# Patient Record
Sex: MALE | Race: WHITE | NOT HISPANIC OR LATINO | URBAN - METROPOLITAN AREA
[De-identification: names, ages, dates, MRNs, and addresses within clinical notes are randomized per-mention and may not be internally consistent; named-entity substitution may affect disease eponyms.]

---

## 2017-01-19 ENCOUNTER — OUTPATIENT (OUTPATIENT)
Dept: OUTPATIENT SERVICES | Facility: HOSPITAL | Age: 67
LOS: 1 days | Discharge: HOME | End: 2017-01-19
Payer: MEDICARE

## 2017-01-19 DIAGNOSIS — Z01.818 ENCOUNTER FOR OTHER PREPROCEDURAL EXAMINATION: ICD-10-CM

## 2017-01-19 DIAGNOSIS — M43.17 SPONDYLOLISTHESIS, LUMBOSACRAL REGION: ICD-10-CM

## 2017-01-19 DIAGNOSIS — M47.26 OTHER SPONDYLOSIS WITH RADICULOPATHY, LUMBAR REGION: ICD-10-CM

## 2017-01-19 DIAGNOSIS — N32.0 BLADDER-NECK OBSTRUCTION: ICD-10-CM

## 2017-01-19 DIAGNOSIS — M96.1 POSTLAMINECTOMY SYNDROME, NOT ELSEWHERE CLASSIFIED: ICD-10-CM

## 2017-01-19 DIAGNOSIS — Z53.09 PROCEDURE AND TREATMENT NOT CARRIED OUT BECAUSE OF OTHER CONTRAINDICATION: ICD-10-CM

## 2017-01-19 DIAGNOSIS — M48.06 SPINAL STENOSIS, LUMBAR REGION: ICD-10-CM

## 2017-01-19 PROCEDURE — 93010 ELECTROCARDIOGRAM REPORT: CPT | Mod: NC

## 2017-01-19 PROCEDURE — 71020: CPT | Mod: 26

## 2017-02-01 ENCOUNTER — OUTPATIENT (OUTPATIENT)
Dept: INPATIENT UNIT | Facility: HOSPITAL | Age: 67
LOS: 1 days | Discharge: HOME | End: 2017-02-01

## 2017-02-01 VITALS
TEMPERATURE: 98 F | OXYGEN SATURATION: 100 % | HEART RATE: 64 BPM | RESPIRATION RATE: 18 BRPM | SYSTOLIC BLOOD PRESSURE: 163 MMHG | WEIGHT: 212.08 LBS | HEIGHT: 71 IN | DIASTOLIC BLOOD PRESSURE: 96 MMHG

## 2017-02-01 VITALS
TEMPERATURE: 98 F | HEART RATE: 65 BPM | OXYGEN SATURATION: 97 % | SYSTOLIC BLOOD PRESSURE: 150 MMHG | DIASTOLIC BLOOD PRESSURE: 89 MMHG | RESPIRATION RATE: 16 BRPM

## 2017-02-01 DIAGNOSIS — M54.9 DORSALGIA, UNSPECIFIED: ICD-10-CM

## 2017-02-01 DIAGNOSIS — M54.16 RADICULOPATHY, LUMBAR REGION: ICD-10-CM

## 2017-02-01 DIAGNOSIS — R09.82 POSTNASAL DRIP: ICD-10-CM

## 2017-02-01 DIAGNOSIS — M43.17 SPONDYLOLISTHESIS, LUMBOSACRAL REGION: ICD-10-CM

## 2017-02-01 DIAGNOSIS — M47.26 OTHER SPONDYLOSIS WITH RADICULOPATHY, LUMBAR REGION: ICD-10-CM

## 2017-02-01 DIAGNOSIS — Z85.46 PERSONAL HISTORY OF MALIGNANT NEOPLASM OF PROSTATE: ICD-10-CM

## 2017-02-01 DIAGNOSIS — M48.06 SPINAL STENOSIS, LUMBAR REGION: ICD-10-CM

## 2017-02-01 DIAGNOSIS — E66.9 OBESITY, UNSPECIFIED: ICD-10-CM

## 2017-02-01 DIAGNOSIS — Z98.890 OTHER SPECIFIED POSTPROCEDURAL STATES: Chronic | ICD-10-CM

## 2017-02-01 DIAGNOSIS — G47.30 SLEEP APNEA, UNSPECIFIED: ICD-10-CM

## 2017-02-01 DIAGNOSIS — N32.0 BLADDER-NECK OBSTRUCTION: ICD-10-CM

## 2017-02-01 DIAGNOSIS — Z90.79 ACQUIRED ABSENCE OF OTHER GENITAL ORGAN(S): Chronic | ICD-10-CM

## 2017-02-01 DIAGNOSIS — Z53.09 PROCEDURE AND TREATMENT NOT CARRIED OUT BECAUSE OF OTHER CONTRAINDICATION: ICD-10-CM

## 2017-02-01 DIAGNOSIS — J30.89 OTHER ALLERGIC RHINITIS: ICD-10-CM

## 2017-02-01 RX ORDER — ONDANSETRON 8 MG/1
4 TABLET, FILM COATED ORAL ONCE
Qty: 0 | Refills: 0 | Status: DISCONTINUED | OUTPATIENT
Start: 2017-02-01 | End: 2017-02-01

## 2017-02-01 RX ORDER — OXYCODONE HYDROCHLORIDE 5 MG/1
5 TABLET ORAL ONCE
Qty: 0 | Refills: 0 | Status: DISCONTINUED | OUTPATIENT
Start: 2017-02-01 | End: 2017-02-01

## 2017-02-01 RX ORDER — SODIUM CHLORIDE 9 MG/ML
1000 INJECTION, SOLUTION INTRAVENOUS
Qty: 0 | Refills: 0 | Status: DISCONTINUED | OUTPATIENT
Start: 2017-02-01 | End: 2017-02-01

## 2017-02-01 RX ADMIN — OXYCODONE HYDROCHLORIDE 5 MILLIGRAM(S): 5 TABLET ORAL at 14:16

## 2017-02-01 NOTE — BRIEF OPERATIVE NOTE - OPERATION/FINDINGS
Surgery was aborted due to inability to pass huang catheter. After consultation (phone) with Urology and given hx of prior prostate surgery, we elected to abort procedure, F/U with urology as outpatient and reschedule this case with URology present at the time of surgery.

## 2017-02-01 NOTE — PATIENT PROFILE ADULT. - VISION (WITH CORRECTIVE LENSES IF THE PATIENT USUALLY WEARS THEM):
Partially impaired: cannot see medication labels or newsprint, but can see obstacles in path, and the surrounding layout; can count fingers at arm's length/glasses for reading

## 2017-02-01 NOTE — PATIENT PROFILE ADULT. - PMH
<<----- Click to add NO pertinent Past Medical History No pertinent past medical history Prostate cancer    Sleep apnea, unspecified type    Spondylosis of lumbosacral region, unspecified spinal osteoarthritis complication status

## 2017-02-02 ENCOUNTER — APPOINTMENT (OUTPATIENT)
Dept: UROLOGY | Facility: CLINIC | Age: 67
End: 2017-02-02

## 2017-02-02 VITALS
WEIGHT: 215 LBS | SYSTOLIC BLOOD PRESSURE: 151 MMHG | DIASTOLIC BLOOD PRESSURE: 73 MMHG | TEMPERATURE: 97.8 F | HEIGHT: 71 IN | HEART RATE: 68 BPM | BODY MASS INDEX: 30.1 KG/M2

## 2017-02-02 DIAGNOSIS — K40.90 UNILATERAL INGUINAL HERNIA, W/OUT OBSTRUCTION OR GANGRENE, NOT SPECIFIED AS RECURRENT: ICD-10-CM

## 2017-02-02 DIAGNOSIS — Z85.46 PERSONAL HISTORY OF MALIGNANT NEOPLASM OF PROSTATE: ICD-10-CM

## 2017-02-02 DIAGNOSIS — Z98.890 OTHER SPECIFIED POSTPROCEDURAL STATES: ICD-10-CM

## 2017-03-15 ENCOUNTER — RECORD ABSTRACTING (OUTPATIENT)
Age: 67
End: 2017-03-15

## 2017-03-15 DIAGNOSIS — N52.9 MALE ERECTILE DYSFUNCTION, UNSPECIFIED: ICD-10-CM

## 2017-03-15 DIAGNOSIS — M54.16 RADICULOPATHY, LUMBAR REGION: ICD-10-CM

## 2017-03-15 DIAGNOSIS — Z82.49 FAMILY HISTORY OF ISCHEMIC HEART DISEASE AND OTHER DISEASES OF THE CIRCULATORY SYSTEM: ICD-10-CM

## 2017-03-15 DIAGNOSIS — K63.5 POLYP OF COLON: ICD-10-CM

## 2017-03-15 DIAGNOSIS — Z80.3 FAMILY HISTORY OF MALIGNANT NEOPLASM OF BREAST: ICD-10-CM

## 2017-03-15 DIAGNOSIS — Z83.3 FAMILY HISTORY OF DIABETES MELLITUS: ICD-10-CM

## 2017-03-15 DIAGNOSIS — Z92.89 PERSONAL HISTORY OF OTHER MEDICAL TREATMENT: ICD-10-CM

## 2017-03-15 DIAGNOSIS — Z78.9 OTHER SPECIFIED HEALTH STATUS: ICD-10-CM

## 2017-03-15 DIAGNOSIS — E55.9 VITAMIN D DEFICIENCY, UNSPECIFIED: ICD-10-CM

## 2017-03-28 ENCOUNTER — OUTPATIENT (OUTPATIENT)
Dept: OUTPATIENT SERVICES | Facility: HOSPITAL | Age: 67
LOS: 1 days | Discharge: HOME | End: 2017-03-28

## 2017-03-28 VITALS
RESPIRATION RATE: 18 BRPM | SYSTOLIC BLOOD PRESSURE: 119 MMHG | DIASTOLIC BLOOD PRESSURE: 81 MMHG | WEIGHT: 223.99 LBS | TEMPERATURE: 98 F | HEART RATE: 57 BPM | OXYGEN SATURATION: 100 % | HEIGHT: 69.5 IN

## 2017-03-28 DIAGNOSIS — Z98.890 OTHER SPECIFIED POSTPROCEDURAL STATES: Chronic | ICD-10-CM

## 2017-03-28 DIAGNOSIS — I87.2 VENOUS INSUFFICIENCY (CHRONIC) (PERIPHERAL): ICD-10-CM

## 2017-03-28 DIAGNOSIS — M96.1 POSTLAMINECTOMY SYNDROME, NOT ELSEWHERE CLASSIFIED: ICD-10-CM

## 2017-03-28 DIAGNOSIS — I10 ESSENTIAL (PRIMARY) HYPERTENSION: ICD-10-CM

## 2017-03-28 DIAGNOSIS — T38.0X5A ADVERSE EFFECT OF GLUCOCORTICOIDS AND SYNTHETIC ANALOGUES, INITIAL ENCOUNTER: ICD-10-CM

## 2017-03-28 DIAGNOSIS — Z90.79 ACQUIRED ABSENCE OF OTHER GENITAL ORGAN(S): Chronic | ICD-10-CM

## 2017-03-28 DIAGNOSIS — M43.17 SPONDYLOLISTHESIS, LUMBOSACRAL REGION: ICD-10-CM

## 2017-03-28 DIAGNOSIS — N32.0 BLADDER-NECK OBSTRUCTION: ICD-10-CM

## 2017-03-28 DIAGNOSIS — M48.07 SPINAL STENOSIS, LUMBOSACRAL REGION: ICD-10-CM

## 2017-03-28 DIAGNOSIS — D72.829 ELEVATED WHITE BLOOD CELL COUNT, UNSPECIFIED: ICD-10-CM

## 2017-03-28 DIAGNOSIS — Z01.818 ENCOUNTER FOR OTHER PREPROCEDURAL EXAMINATION: ICD-10-CM

## 2017-03-28 DIAGNOSIS — M48.06 SPINAL STENOSIS, LUMBAR REGION: ICD-10-CM

## 2017-03-28 LAB
ANION GAP SERPL CALC-SCNC: 4 MMOL/L — LOW (ref 5–17)
APPEARANCE UR: CLEAR — SIGNIFICANT CHANGE UP
APTT BLD: 31.4 SEC — SIGNIFICANT CHANGE UP (ref 27.5–37.4)
BASOPHILS # BLD AUTO: 0.1 K/UL — SIGNIFICANT CHANGE UP (ref 0–0.2)
BASOPHILS NFR BLD AUTO: 1.1 % — SIGNIFICANT CHANGE UP (ref 0–2)
BILIRUB UR-MCNC: NEGATIVE — SIGNIFICANT CHANGE UP
BLD GP AB SCN SERPL QL: SIGNIFICANT CHANGE UP
BUN SERPL-MCNC: 19 MG/DL — SIGNIFICANT CHANGE UP (ref 7–23)
CALCIUM SERPL-MCNC: 9.3 MG/DL — SIGNIFICANT CHANGE UP (ref 8.5–10.1)
CHLORIDE SERPL-SCNC: 107 MMOL/L — SIGNIFICANT CHANGE UP (ref 96–108)
CO2 SERPL-SCNC: 31 MMOL/L — SIGNIFICANT CHANGE UP (ref 22–31)
COLOR SPEC: YELLOW — SIGNIFICANT CHANGE UP
CREAT SERPL-MCNC: 0.98 MG/DL — SIGNIFICANT CHANGE UP (ref 0.5–1.3)
DIFF PNL FLD: NEGATIVE — SIGNIFICANT CHANGE UP
EOSINOPHIL # BLD AUTO: 0.2 K/UL — SIGNIFICANT CHANGE UP (ref 0–0.5)
EOSINOPHIL NFR BLD AUTO: 3.5 % — SIGNIFICANT CHANGE UP (ref 0–6)
GLUCOSE SERPL-MCNC: 107 MG/DL — HIGH (ref 70–99)
GLUCOSE UR QL: NEGATIVE MG/DL — SIGNIFICANT CHANGE UP
HCT VFR BLD CALC: 44.6 % — SIGNIFICANT CHANGE UP (ref 39–50)
HGB BLD-MCNC: 15 G/DL — SIGNIFICANT CHANGE UP (ref 13–17)
INR BLD: 0.95 RATIO — SIGNIFICANT CHANGE UP (ref 0.88–1.16)
KETONES UR-MCNC: NEGATIVE — SIGNIFICANT CHANGE UP
LEUKOCYTE ESTERASE UR-ACNC: NEGATIVE — SIGNIFICANT CHANGE UP
LYMPHOCYTES # BLD AUTO: 2.2 K/UL — SIGNIFICANT CHANGE UP (ref 1–3.3)
LYMPHOCYTES # BLD AUTO: 33.9 % — SIGNIFICANT CHANGE UP (ref 13–44)
MCHC RBC-ENTMCNC: 31 PG — SIGNIFICANT CHANGE UP (ref 27–34)
MCHC RBC-ENTMCNC: 33.6 GM/DL — SIGNIFICANT CHANGE UP (ref 32–36)
MCV RBC AUTO: 92.3 FL — SIGNIFICANT CHANGE UP (ref 80–100)
MONOCYTES # BLD AUTO: 0.5 K/UL — SIGNIFICANT CHANGE UP (ref 0–0.9)
MONOCYTES NFR BLD AUTO: 7.9 % — SIGNIFICANT CHANGE UP (ref 2–14)
MRSA PCR RESULT.: SIGNIFICANT CHANGE UP
NEUTROPHILS # BLD AUTO: 3.6 K/UL — SIGNIFICANT CHANGE UP (ref 1.8–7.4)
NEUTROPHILS NFR BLD AUTO: 53.6 % — SIGNIFICANT CHANGE UP (ref 43–77)
NITRITE UR-MCNC: NEGATIVE — SIGNIFICANT CHANGE UP
PH UR: 5 — SIGNIFICANT CHANGE UP (ref 4.8–8)
PLATELET # BLD AUTO: 256 K/UL — SIGNIFICANT CHANGE UP (ref 150–400)
POTASSIUM SERPL-MCNC: 5 MMOL/L — SIGNIFICANT CHANGE UP (ref 3.5–5.3)
POTASSIUM SERPL-SCNC: 5 MMOL/L — SIGNIFICANT CHANGE UP (ref 3.5–5.3)
PROT UR-MCNC: NEGATIVE MG/DL — SIGNIFICANT CHANGE UP
PROTHROM AB SERPL-ACNC: 10.2 SEC — SIGNIFICANT CHANGE UP (ref 9.8–12.7)
RBC # BLD: 4.83 M/UL — SIGNIFICANT CHANGE UP (ref 4.2–5.8)
RBC # FLD: 11.8 % — SIGNIFICANT CHANGE UP (ref 10.3–14.5)
S AUREUS DNA NOSE QL NAA+PROBE: SIGNIFICANT CHANGE UP
SODIUM SERPL-SCNC: 142 MMOL/L — SIGNIFICANT CHANGE UP (ref 135–145)
SP GR SPEC: 1.01 — SIGNIFICANT CHANGE UP (ref 1.01–1.02)
TYPE + AB SCN PNL BLD: SIGNIFICANT CHANGE UP
UROBILINOGEN FLD QL: NEGATIVE MG/DL — SIGNIFICANT CHANGE UP
WBC # BLD: 6.6 K/UL — SIGNIFICANT CHANGE UP (ref 3.8–10.5)
WBC # FLD AUTO: 6.6 K/UL — SIGNIFICANT CHANGE UP (ref 3.8–10.5)

## 2017-03-28 RX ORDER — MONTELUKAST 4 MG/1
1 TABLET, CHEWABLE ORAL
Qty: 0 | Refills: 0 | COMMUNITY

## 2017-03-28 NOTE — H&P PST ADULT - ASSESSMENT
67 years old male present to PST prior to L2-S1 Laminectomy, L5-S1 Fusion with Instrumentation , possible Posterior Lumbar Interbody Fusion with Dr. Marlee Cortez.   Plan   1. NPO after midnight  2. Take the following medications with sips of water on the day of procedure: may take Vicodin if needed.  3. Use E-Z sponge as directed  4. Use Mupirocin as directed.  5. Drink a quart of extra  fluids the day before your surgery.  6 Medical clearance with Dr. Augie Childers (PCP) and Dr. Chace Otero (Urologist)  7. CBC, BMP,  PT /PTT /INR, Urinalysis, Type and Screen, MRSA sent to lab  8. EKG and CXR on chart from 1/2017  9. Urinary Catheter placement with Dr. Otero prior to coming for surgery

## 2017-03-28 NOTE — H&P PST ADULT - FAMILY HISTORY
Father  Still living? No  Family history of heart failure, Age at diagnosis: Age Unknown     Mother  Still living? No  Family history of cancer, Age at diagnosis: Age Unknown

## 2017-03-28 NOTE — H&P PST ADULT - HISTORY OF PRESENT ILLNESS
67 years old male with pain to lower back. "Across my entire lower back." Admits to progressively increasing pain "for years".  Admits to paresthesia to bilateral lower extremities at times.  Admits to locking of knees. Denies falls. Denies changes in bowel or urinary habits.  Surgery rescheduled from a month ago due to urethral stricture. Patient to have urinary catheter placed by Urologist prior to surgery.  Planned surgery.

## 2017-03-28 NOTE — H&P PST ADULT - PMH
Prostate cancer  Prostatectomy  Sciatica, unspecified laterality    Seasonal allergic rhinitis, unspecified allergic rhinitis trigger    Sleep apnea, unspecified type  Oral device. No CPAP Machine  Spinal stenosis of lumbar region    Spondylosis of lumbosacral region, unspecified spinal osteoarthritis complication status    Urethral stricture, unspecified stricture type    Varicose veins  left lower extremity

## 2017-03-31 ENCOUNTER — NON-APPOINTMENT (OUTPATIENT)
Age: 67
End: 2017-03-31

## 2017-03-31 ENCOUNTER — APPOINTMENT (OUTPATIENT)
Dept: INTERNAL MEDICINE | Facility: CLINIC | Age: 67
End: 2017-03-31

## 2017-03-31 VITALS
RESPIRATION RATE: 20 BRPM | BODY MASS INDEX: 30.8 KG/M2 | TEMPERATURE: 97.8 F | OXYGEN SATURATION: 98 % | HEIGHT: 71 IN | HEART RATE: 60 BPM | SYSTOLIC BLOOD PRESSURE: 110 MMHG | WEIGHT: 220 LBS | DIASTOLIC BLOOD PRESSURE: 68 MMHG

## 2017-03-31 DIAGNOSIS — Z80.3 FAMILY HISTORY OF MALIGNANT NEOPLASM OF BREAST: ICD-10-CM

## 2017-03-31 DIAGNOSIS — R32 INTRINSIC SPHINCTER DEFICIENCY (ISD): ICD-10-CM

## 2017-03-31 DIAGNOSIS — N36.42 INTRINSIC SPHINCTER DEFICIENCY (ISD): ICD-10-CM

## 2017-03-31 DIAGNOSIS — K46.9 UNSPECIFIED ABDOMINAL HERNIA W/OUT OBSTRUCTION OR GANGRENE: ICD-10-CM

## 2017-03-31 DIAGNOSIS — Z87.39 PERSONAL HISTORY OF OTHER DISEASES OF THE MUSCULOSKELETAL SYSTEM AND CONNECTIVE TISSUE: ICD-10-CM

## 2017-03-31 DIAGNOSIS — Z85.46 PERSONAL HISTORY OF MALIGNANT NEOPLASM OF PROSTATE: ICD-10-CM

## 2017-03-31 DIAGNOSIS — I87.2 VENOUS INSUFFICIENCY (CHRONIC) (PERIPHERAL): ICD-10-CM

## 2017-03-31 DIAGNOSIS — Z78.9 OTHER SPECIFIED HEALTH STATUS: ICD-10-CM

## 2017-03-31 RX ORDER — PHENAZOPYRIDINE 200 MG/1
200 TABLET, FILM COATED ORAL 3 TIMES DAILY
Qty: 21 | Refills: 3 | Status: COMPLETED | COMMUNITY
Start: 2017-02-02 | End: 2017-03-31

## 2017-03-31 RX ORDER — MONTELUKAST SODIUM 10 MG/1
10 TABLET, FILM COATED ORAL DAILY
Refills: 0 | Status: COMPLETED | COMMUNITY
End: 2017-03-31

## 2017-03-31 RX ORDER — PSYLLIUM HUSK 0.4 G
CAPSULE ORAL
Refills: 0 | Status: COMPLETED | COMMUNITY
End: 2017-03-31

## 2017-03-31 RX ORDER — HYDROCODONE BITARTRATE AND ACETAMINOPHEN 5; 300 MG/1; MG/1
TABLET ORAL
Refills: 0 | Status: COMPLETED | COMMUNITY

## 2017-03-31 RX ORDER — MULTIVITAMIN
TABLET ORAL
Refills: 0 | Status: COMPLETED | COMMUNITY
End: 2017-03-31

## 2017-03-31 RX ORDER — CIPROFLOXACIN HYDROCHLORIDE 500 MG/1
500 TABLET, FILM COATED ORAL TWICE DAILY
Qty: 10 | Refills: 0 | Status: COMPLETED | COMMUNITY
Start: 2017-02-02 | End: 2017-03-31

## 2017-03-31 RX ORDER — HYDROCODONE BITARTRATE AND ACETAMINOPHEN 10; 300 MG/1; MG/1
10-300 TABLET ORAL
Refills: 0 | Status: COMPLETED | COMMUNITY
End: 2017-03-31

## 2017-04-04 ENCOUNTER — RESULT REVIEW (OUTPATIENT)
Age: 67
End: 2017-04-04

## 2017-04-04 RX ORDER — HYDROMORPHONE HYDROCHLORIDE 2 MG/ML
30 INJECTION INTRAMUSCULAR; INTRAVENOUS; SUBCUTANEOUS
Qty: 0 | Refills: 0 | Status: DISCONTINUED | OUTPATIENT
Start: 2017-04-05 | End: 2017-04-07

## 2017-04-04 RX ORDER — OXYCODONE HYDROCHLORIDE 5 MG/1
10 TABLET ORAL ONCE
Qty: 0 | Refills: 0 | Status: DISCONTINUED | OUTPATIENT
Start: 2017-04-05 | End: 2017-04-05

## 2017-04-04 RX ORDER — NALOXONE HYDROCHLORIDE 4 MG/.1ML
0.1 SPRAY NASAL
Qty: 0 | Refills: 0 | Status: DISCONTINUED | OUTPATIENT
Start: 2017-04-05 | End: 2017-04-09

## 2017-04-04 RX ORDER — FAMOTIDINE 10 MG/ML
20 INJECTION INTRAVENOUS ONCE
Qty: 0 | Refills: 0 | Status: COMPLETED | OUTPATIENT
Start: 2017-04-05 | End: 2017-04-05

## 2017-04-04 RX ORDER — ONDANSETRON 8 MG/1
4 TABLET, FILM COATED ORAL EVERY 6 HOURS
Qty: 0 | Refills: 0 | Status: DISCONTINUED | OUTPATIENT
Start: 2017-04-05 | End: 2017-04-09

## 2017-04-04 RX ORDER — SODIUM CHLORIDE 9 MG/ML
1000 INJECTION INTRAMUSCULAR; INTRAVENOUS; SUBCUTANEOUS
Qty: 0 | Refills: 0 | Status: DISCONTINUED | OUTPATIENT
Start: 2017-04-05 | End: 2017-04-05

## 2017-04-04 RX ORDER — CELECOXIB 200 MG/1
200 CAPSULE ORAL ONCE
Qty: 0 | Refills: 0 | Status: COMPLETED | OUTPATIENT
Start: 2017-04-05 | End: 2017-04-05

## 2017-04-04 RX ORDER — ACETAMINOPHEN 500 MG
975 TABLET ORAL ONCE
Qty: 0 | Refills: 0 | Status: COMPLETED | OUTPATIENT
Start: 2017-04-05 | End: 2017-04-05

## 2017-04-05 ENCOUNTER — INPATIENT (INPATIENT)
Facility: HOSPITAL | Age: 67
LOS: 3 days | Discharge: HOME | End: 2017-04-09
Attending: ORTHOPAEDIC SURGERY | Admitting: ORTHOPAEDIC SURGERY
Payer: MEDICARE

## 2017-04-05 VITALS
DIASTOLIC BLOOD PRESSURE: 77 MMHG | WEIGHT: 212.53 LBS | RESPIRATION RATE: 16 BRPM | HEART RATE: 57 BPM | TEMPERATURE: 98 F | SYSTOLIC BLOOD PRESSURE: 141 MMHG | HEIGHT: 71 IN | OXYGEN SATURATION: 97 %

## 2017-04-05 DIAGNOSIS — Z98.890 OTHER SPECIFIED POSTPROCEDURAL STATES: Chronic | ICD-10-CM

## 2017-04-05 DIAGNOSIS — Z90.79 ACQUIRED ABSENCE OF OTHER GENITAL ORGAN(S): Chronic | ICD-10-CM

## 2017-04-05 LAB
ANION GAP SERPL CALC-SCNC: 8 MMOL/L — SIGNIFICANT CHANGE UP (ref 5–17)
BASOPHILS # BLD AUTO: 0 K/UL — SIGNIFICANT CHANGE UP (ref 0–0.2)
BASOPHILS NFR BLD AUTO: 0.2 % — SIGNIFICANT CHANGE UP (ref 0–2)
BUN SERPL-MCNC: 18 MG/DL — SIGNIFICANT CHANGE UP (ref 7–23)
CALCIUM SERPL-MCNC: 8 MG/DL — LOW (ref 8.5–10.1)
CHLORIDE SERPL-SCNC: 110 MMOL/L — HIGH (ref 96–108)
CO2 SERPL-SCNC: 24 MMOL/L — SIGNIFICANT CHANGE UP (ref 22–31)
CREAT SERPL-MCNC: 1.01 MG/DL — SIGNIFICANT CHANGE UP (ref 0.5–1.3)
EOSINOPHIL # BLD AUTO: 0 K/UL — SIGNIFICANT CHANGE UP (ref 0–0.5)
EOSINOPHIL NFR BLD AUTO: 0.3 % — SIGNIFICANT CHANGE UP (ref 0–6)
GLUCOSE SERPL-MCNC: 140 MG/DL — HIGH (ref 70–99)
HCT VFR BLD CALC: 38.9 % — LOW (ref 39–50)
HGB BLD-MCNC: 13.4 G/DL — SIGNIFICANT CHANGE UP (ref 13–17)
LYMPHOCYTES # BLD AUTO: 0.9 K/UL — LOW (ref 1–3.3)
LYMPHOCYTES # BLD AUTO: 10.7 % — LOW (ref 13–44)
MCHC RBC-ENTMCNC: 31.5 PG — SIGNIFICANT CHANGE UP (ref 27–34)
MCHC RBC-ENTMCNC: 34.3 GM/DL — SIGNIFICANT CHANGE UP (ref 32–36)
MCV RBC AUTO: 91.9 FL — SIGNIFICANT CHANGE UP (ref 80–100)
MONOCYTES # BLD AUTO: 0.1 K/UL — SIGNIFICANT CHANGE UP (ref 0–0.9)
MONOCYTES NFR BLD AUTO: 1.4 % — LOW (ref 2–14)
NEUTROPHILS # BLD AUTO: 7.3 K/UL — SIGNIFICANT CHANGE UP (ref 1.8–7.4)
NEUTROPHILS NFR BLD AUTO: 87.4 % — HIGH (ref 43–77)
PLATELET # BLD AUTO: 194 K/UL — SIGNIFICANT CHANGE UP (ref 150–400)
POTASSIUM SERPL-MCNC: 4.4 MMOL/L — SIGNIFICANT CHANGE UP (ref 3.5–5.3)
POTASSIUM SERPL-SCNC: 4.4 MMOL/L — SIGNIFICANT CHANGE UP (ref 3.5–5.3)
RBC # BLD: 4.24 M/UL — SIGNIFICANT CHANGE UP (ref 4.2–5.8)
RBC # FLD: 11.5 % — SIGNIFICANT CHANGE UP (ref 10.3–14.5)
SODIUM SERPL-SCNC: 142 MMOL/L — SIGNIFICANT CHANGE UP (ref 135–145)
WBC # BLD: 8.4 K/UL — SIGNIFICANT CHANGE UP (ref 3.8–10.5)
WBC # FLD AUTO: 8.4 K/UL — SIGNIFICANT CHANGE UP (ref 3.8–10.5)

## 2017-04-05 PROCEDURE — 72100 X-RAY EXAM L-S SPINE 2/3 VWS: CPT | Mod: 26

## 2017-04-05 PROCEDURE — 88304 TISSUE EXAM BY PATHOLOGIST: CPT | Mod: 26

## 2017-04-05 RX ORDER — ONDANSETRON 8 MG/1
4 TABLET, FILM COATED ORAL EVERY 6 HOURS
Qty: 0 | Refills: 0 | Status: DISCONTINUED | OUTPATIENT
Start: 2017-04-05 | End: 2017-04-09

## 2017-04-05 RX ORDER — MAGNESIUM HYDROXIDE 400 MG/1
30 TABLET, CHEWABLE ORAL EVERY 12 HOURS
Qty: 0 | Refills: 0 | Status: DISCONTINUED | OUTPATIENT
Start: 2017-04-05 | End: 2017-04-09

## 2017-04-05 RX ORDER — SODIUM CHLORIDE 9 MG/ML
3 INJECTION INTRAMUSCULAR; INTRAVENOUS; SUBCUTANEOUS EVERY 8 HOURS
Qty: 0 | Refills: 0 | Status: DISCONTINUED | OUTPATIENT
Start: 2017-04-05 | End: 2017-04-05

## 2017-04-05 RX ORDER — MEPERIDINE HYDROCHLORIDE 50 MG/ML
12.5 INJECTION INTRAMUSCULAR; INTRAVENOUS; SUBCUTANEOUS
Qty: 0 | Refills: 0 | Status: DISCONTINUED | OUTPATIENT
Start: 2017-04-05 | End: 2017-04-05

## 2017-04-05 RX ORDER — CEFAZOLIN SODIUM 1 G
2000 VIAL (EA) INJECTION EVERY 8 HOURS
Qty: 0 | Refills: 0 | Status: COMPLETED | OUTPATIENT
Start: 2017-04-05 | End: 2017-04-06

## 2017-04-05 RX ORDER — CYCLOBENZAPRINE HYDROCHLORIDE 10 MG/1
10 TABLET, FILM COATED ORAL EVERY 8 HOURS
Qty: 0 | Refills: 0 | Status: DISCONTINUED | OUTPATIENT
Start: 2017-04-05 | End: 2017-04-09

## 2017-04-05 RX ORDER — ACETAMINOPHEN 500 MG
650 TABLET ORAL EVERY 6 HOURS
Qty: 0 | Refills: 0 | Status: DISCONTINUED | OUTPATIENT
Start: 2017-04-05 | End: 2017-04-09

## 2017-04-05 RX ORDER — ONDANSETRON 8 MG/1
4 TABLET, FILM COATED ORAL ONCE
Qty: 0 | Refills: 0 | Status: DISCONTINUED | OUTPATIENT
Start: 2017-04-05 | End: 2017-04-05

## 2017-04-05 RX ORDER — DOCUSATE SODIUM 100 MG
100 CAPSULE ORAL THREE TIMES A DAY
Qty: 0 | Refills: 0 | Status: DISCONTINUED | OUTPATIENT
Start: 2017-04-05 | End: 2017-04-09

## 2017-04-05 RX ORDER — SENNA PLUS 8.6 MG/1
2 TABLET ORAL AT BEDTIME
Qty: 0 | Refills: 0 | Status: DISCONTINUED | OUTPATIENT
Start: 2017-04-05 | End: 2017-04-09

## 2017-04-05 RX ORDER — ACETAMINOPHEN 500 MG
1000 TABLET ORAL ONCE
Qty: 0 | Refills: 0 | Status: COMPLETED | OUTPATIENT
Start: 2017-04-05 | End: 2017-04-05

## 2017-04-05 RX ORDER — ASCORBIC ACID 60 MG
0 TABLET,CHEWABLE ORAL
Qty: 0 | Refills: 0 | COMMUNITY

## 2017-04-05 RX ORDER — OXYCODONE HYDROCHLORIDE 5 MG/1
5 TABLET ORAL EVERY 4 HOURS
Qty: 0 | Refills: 0 | Status: DISCONTINUED | OUTPATIENT
Start: 2017-04-05 | End: 2017-04-05

## 2017-04-05 RX ORDER — SODIUM CHLORIDE 9 MG/ML
1000 INJECTION, SOLUTION INTRAVENOUS
Qty: 0 | Refills: 0 | Status: DISCONTINUED | OUTPATIENT
Start: 2017-04-05 | End: 2017-04-09

## 2017-04-05 RX ORDER — FAMOTIDINE 10 MG/ML
20 INJECTION INTRAVENOUS EVERY 12 HOURS
Qty: 0 | Refills: 0 | Status: DISCONTINUED | OUTPATIENT
Start: 2017-04-05 | End: 2017-04-09

## 2017-04-05 RX ORDER — HYDROMORPHONE HYDROCHLORIDE 2 MG/ML
0.5 INJECTION INTRAMUSCULAR; INTRAVENOUS; SUBCUTANEOUS
Qty: 0 | Refills: 0 | Status: DISCONTINUED | OUTPATIENT
Start: 2017-04-05 | End: 2017-04-05

## 2017-04-05 RX ADMIN — CYCLOBENZAPRINE HYDROCHLORIDE 10 MILLIGRAM(S): 10 TABLET, FILM COATED ORAL at 22:06

## 2017-04-05 RX ADMIN — Medication 100 MILLIGRAM(S): at 21:52

## 2017-04-05 RX ADMIN — CELECOXIB 200 MILLIGRAM(S): 200 CAPSULE ORAL at 10:03

## 2017-04-05 RX ADMIN — OXYCODONE HYDROCHLORIDE 10 MILLIGRAM(S): 5 TABLET ORAL at 10:04

## 2017-04-05 RX ADMIN — Medication 400 MILLIGRAM(S): at 17:49

## 2017-04-05 RX ADMIN — SODIUM CHLORIDE 100 MILLILITER(S): 9 INJECTION INTRAMUSCULAR; INTRAVENOUS; SUBCUTANEOUS at 16:39

## 2017-04-05 RX ADMIN — HYDROMORPHONE HYDROCHLORIDE 30 MILLILITER(S): 2 INJECTION INTRAMUSCULAR; INTRAVENOUS; SUBCUTANEOUS at 16:37

## 2017-04-05 RX ADMIN — FAMOTIDINE 20 MILLIGRAM(S): 10 INJECTION INTRAVENOUS at 10:04

## 2017-04-05 RX ADMIN — Medication 650 MILLIGRAM(S): at 22:06

## 2017-04-05 RX ADMIN — Medication 975 MILLIGRAM(S): at 10:03

## 2017-04-05 RX ADMIN — SENNA PLUS 2 TABLET(S): 8.6 TABLET ORAL at 21:52

## 2017-04-05 RX ADMIN — Medication 100 MILLIGRAM(S): at 21:51

## 2017-04-05 RX ADMIN — Medication 1000 MILLIGRAM(S): at 19:35

## 2017-04-05 RX ADMIN — SODIUM CHLORIDE 100 MILLILITER(S): 9 INJECTION, SOLUTION INTRAVENOUS at 19:25

## 2017-04-06 LAB
ANION GAP SERPL CALC-SCNC: 8 MMOL/L — SIGNIFICANT CHANGE UP (ref 5–17)
BASOPHILS # BLD AUTO: 0.1 K/UL — SIGNIFICANT CHANGE UP (ref 0–0.2)
BASOPHILS NFR BLD AUTO: 0.5 % — SIGNIFICANT CHANGE UP (ref 0–2)
BUN SERPL-MCNC: 13 MG/DL — SIGNIFICANT CHANGE UP (ref 7–23)
CALCIUM SERPL-MCNC: 8.2 MG/DL — LOW (ref 8.5–10.1)
CHLORIDE SERPL-SCNC: 105 MMOL/L — SIGNIFICANT CHANGE UP (ref 96–108)
CO2 SERPL-SCNC: 27 MMOL/L — SIGNIFICANT CHANGE UP (ref 22–31)
CREAT SERPL-MCNC: 0.83 MG/DL — SIGNIFICANT CHANGE UP (ref 0.5–1.3)
EOSINOPHIL # BLD AUTO: 0 K/UL — SIGNIFICANT CHANGE UP (ref 0–0.5)
EOSINOPHIL NFR BLD AUTO: 0.1 % — SIGNIFICANT CHANGE UP (ref 0–6)
GLUCOSE SERPL-MCNC: 108 MG/DL — HIGH (ref 70–99)
HCT VFR BLD CALC: 37.6 % — LOW (ref 39–50)
HGB BLD-MCNC: 12.7 G/DL — LOW (ref 13–17)
LYMPHOCYTES # BLD AUTO: 1.9 K/UL — SIGNIFICANT CHANGE UP (ref 1–3.3)
LYMPHOCYTES # BLD AUTO: 14.6 % — SIGNIFICANT CHANGE UP (ref 13–44)
MCHC RBC-ENTMCNC: 31.4 PG — SIGNIFICANT CHANGE UP (ref 27–34)
MCHC RBC-ENTMCNC: 33.8 GM/DL — SIGNIFICANT CHANGE UP (ref 32–36)
MCV RBC AUTO: 92.7 FL — SIGNIFICANT CHANGE UP (ref 80–100)
MONOCYTES # BLD AUTO: 0.8 K/UL — SIGNIFICANT CHANGE UP (ref 0–0.9)
MONOCYTES NFR BLD AUTO: 5.7 % — SIGNIFICANT CHANGE UP (ref 2–14)
NEUTROPHILS # BLD AUTO: 10.4 K/UL — HIGH (ref 1.8–7.4)
NEUTROPHILS NFR BLD AUTO: 79.1 % — HIGH (ref 43–77)
PLATELET # BLD AUTO: 221 K/UL — SIGNIFICANT CHANGE UP (ref 150–400)
POTASSIUM SERPL-MCNC: 5 MMOL/L — SIGNIFICANT CHANGE UP (ref 3.5–5.3)
POTASSIUM SERPL-SCNC: 5 MMOL/L — SIGNIFICANT CHANGE UP (ref 3.5–5.3)
RBC # BLD: 4.05 M/UL — LOW (ref 4.2–5.8)
RBC # FLD: 11.7 % — SIGNIFICANT CHANGE UP (ref 10.3–14.5)
SODIUM SERPL-SCNC: 140 MMOL/L — SIGNIFICANT CHANGE UP (ref 135–145)
WBC # BLD: 13.2 K/UL — HIGH (ref 3.8–10.5)
WBC # FLD AUTO: 13.2 K/UL — HIGH (ref 3.8–10.5)

## 2017-04-06 PROCEDURE — 93970 EXTREMITY STUDY: CPT | Mod: 26

## 2017-04-06 RX ORDER — DEXAMETHASONE 0.5 MG/5ML
8 ELIXIR ORAL EVERY 12 HOURS
Qty: 0 | Refills: 0 | Status: DISCONTINUED | OUTPATIENT
Start: 2017-04-06 | End: 2017-04-06

## 2017-04-06 RX ORDER — DEXAMETHASONE 0.5 MG/5ML
4 ELIXIR ORAL EVERY 12 HOURS
Qty: 0 | Refills: 0 | Status: COMPLETED | OUTPATIENT
Start: 2017-04-08 | End: 2017-04-09

## 2017-04-06 RX ORDER — DEXAMETHASONE 0.5 MG/5ML
2 ELIXIR ORAL EVERY 12 HOURS
Qty: 0 | Refills: 0 | Status: DISCONTINUED | OUTPATIENT
Start: 2017-04-09 | End: 2017-04-09

## 2017-04-06 RX ORDER — DEXAMETHASONE 0.5 MG/5ML
6 ELIXIR ORAL EVERY 12 HOURS
Qty: 0 | Refills: 0 | Status: COMPLETED | OUTPATIENT
Start: 2017-04-07 | End: 2017-04-08

## 2017-04-06 RX ORDER — DEXAMETHASONE 0.5 MG/5ML
6 ELIXIR ORAL EVERY 6 HOURS
Qty: 0 | Refills: 0 | Status: DISCONTINUED | OUTPATIENT
Start: 2017-04-06 | End: 2017-04-06

## 2017-04-06 RX ORDER — DEXAMETHASONE 0.5 MG/5ML
8 ELIXIR ORAL EVERY 12 HOURS
Qty: 0 | Refills: 0 | Status: COMPLETED | OUTPATIENT
Start: 2017-04-06 | End: 2017-04-07

## 2017-04-06 RX ADMIN — SODIUM CHLORIDE 100 MILLILITER(S): 9 INJECTION, SOLUTION INTRAVENOUS at 17:18

## 2017-04-06 RX ADMIN — SODIUM CHLORIDE 100 MILLILITER(S): 9 INJECTION, SOLUTION INTRAVENOUS at 06:37

## 2017-04-06 RX ADMIN — CYCLOBENZAPRINE HYDROCHLORIDE 10 MILLIGRAM(S): 10 TABLET, FILM COATED ORAL at 14:09

## 2017-04-06 RX ADMIN — SENNA PLUS 2 TABLET(S): 8.6 TABLET ORAL at 21:59

## 2017-04-06 RX ADMIN — Medication 100 MILLIGRAM(S): at 13:08

## 2017-04-06 RX ADMIN — Medication 100 MILLIGRAM(S): at 21:59

## 2017-04-06 RX ADMIN — CYCLOBENZAPRINE HYDROCHLORIDE 10 MILLIGRAM(S): 10 TABLET, FILM COATED ORAL at 21:59

## 2017-04-06 RX ADMIN — HYDROMORPHONE HYDROCHLORIDE 30 MILLILITER(S): 2 INJECTION INTRAMUSCULAR; INTRAVENOUS; SUBCUTANEOUS at 23:08

## 2017-04-06 RX ADMIN — CYCLOBENZAPRINE HYDROCHLORIDE 10 MILLIGRAM(S): 10 TABLET, FILM COATED ORAL at 06:36

## 2017-04-06 RX ADMIN — Medication 100 MILLIGRAM(S): at 06:37

## 2017-04-06 RX ADMIN — Medication 100 MILLIGRAM(S): at 06:35

## 2017-04-06 RX ADMIN — Medication 101.6 MILLIGRAM(S): at 19:10

## 2017-04-06 NOTE — PHYSICAL THERAPY INITIAL EVALUATION ADULT - GENERAL OBSERVATIONS, REHAB EVAL
pt recd supine in bed in No apparent distress, pleasant ans agreeable to PT.  huang catheter, c-vac C/D/I, PCA intact.

## 2017-04-06 NOTE — PHYSICAL THERAPY INITIAL EVALUATION ADULT - PLANNED THERAPY INTERVENTIONS, PT EVAL
strengthening/balance training/bed mobility training/transfer training/postural re-education/gait training

## 2017-04-06 NOTE — PROGRESS NOTE ADULT - SUBJECTIVE AND OBJECTIVE BOX
POD#1. Pt seen in bed c/o right hip pain radiating to posterior aspect of right thigh. Pt states he noticed symptoms at 2am last night. He had similar symptoms in the past with LLE pre-op. Heating compress alleviates the pain, whereas he noticed pain aggravated after sessions with physical therapy. Huang in place. Tolerating current diet.     PE  Gen appearance: NAD  Motor strength:5/5 of b/l lower extremities   Sensation intact  Mild calf tenderness of the RLE. No calf tenderness of the LLE  Incisional site clean and dry  Drain 255CC.    Plan  Afebrile, VSS,  WBC (13.2H) 2/2 surgery continue to monitor  H/H:12.7L/37.6L  PCA and huang kept  Encouraged incentive spirometer and mobilize as tolerated with physical therapy  Start Decadron taper. Decadron 8mg BID followed by Decadon 6mg BID followed by Decadron 4mg BID, followed by Decadron 2mg BID  Discussed with Dr. Cortez. POD#1. Pt seen in bed c/o right hip pain radiating to posterior aspect of right thigh. Pt states he noticed symptoms at 2am last night. He had similar symptoms in the past with LLE pre-op. Heating compress alleviates the pain, whereas he noticed pain aggravated after sessions with physical therapy. Huang in place. Tolerating current diet.     PE  Gen appearance: NAD  Motor strength:5/5 of b/l lower extremities   Sensation intact  Mild calf tenderness of the RLE. No calf tenderness of the LLE  Incisional site clean and dry  Drain 255CC.    Plan  Afebrile, VSS,  WBC (13.2H) 2/2 surgery continue to monitor  H/H:12.7L/37.6L  PCA and huang kept  Encouraged incentive spirometer and mobilize as tolerated with physical therapy  Start Decadron taper. Decadron 8mg BID followed by Decadon 6mg BID followed by Decadron 4mg BID, followed by Decadron 2mg BID  US of b/l lower extremities  Discussed with Dr. Cortez.

## 2017-04-07 LAB
ANION GAP SERPL CALC-SCNC: 8 MMOL/L — SIGNIFICANT CHANGE UP (ref 5–17)
BASOPHILS # BLD AUTO: 0 K/UL — SIGNIFICANT CHANGE UP (ref 0–0.2)
BASOPHILS NFR BLD AUTO: 0.2 % — SIGNIFICANT CHANGE UP (ref 0–2)
BUN SERPL-MCNC: 10 MG/DL — SIGNIFICANT CHANGE UP (ref 7–23)
CALCIUM SERPL-MCNC: 9.2 MG/DL — SIGNIFICANT CHANGE UP (ref 8.5–10.1)
CHLORIDE SERPL-SCNC: 101 MMOL/L — SIGNIFICANT CHANGE UP (ref 96–108)
CO2 SERPL-SCNC: 27 MMOL/L — SIGNIFICANT CHANGE UP (ref 22–31)
CREAT SERPL-MCNC: 0.75 MG/DL — SIGNIFICANT CHANGE UP (ref 0.5–1.3)
EOSINOPHIL # BLD AUTO: 0 K/UL — SIGNIFICANT CHANGE UP (ref 0–0.5)
EOSINOPHIL NFR BLD AUTO: 0 % — SIGNIFICANT CHANGE UP (ref 0–6)
GLUCOSE SERPL-MCNC: 132 MG/DL — HIGH (ref 70–99)
HCT VFR BLD CALC: 40.5 % — SIGNIFICANT CHANGE UP (ref 39–50)
HGB BLD-MCNC: 14.5 G/DL — SIGNIFICANT CHANGE UP (ref 13–17)
MCHC RBC-ENTMCNC: 32.1 PG — SIGNIFICANT CHANGE UP (ref 27–34)
MCHC RBC-ENTMCNC: 35.8 GM/DL — SIGNIFICANT CHANGE UP (ref 32–36)
MCV RBC AUTO: 89.5 FL — SIGNIFICANT CHANGE UP (ref 80–100)
NEUTROPHILS # BLD AUTO: 10.4 K/UL — HIGH (ref 1.8–7.4)
NEUTROPHILS NFR BLD AUTO: 75.7 % — SIGNIFICANT CHANGE UP (ref 43–77)
PLATELET # BLD AUTO: 205 K/UL — SIGNIFICANT CHANGE UP (ref 150–400)
POTASSIUM SERPL-MCNC: 4.2 MMOL/L — SIGNIFICANT CHANGE UP (ref 3.5–5.3)
POTASSIUM SERPL-SCNC: 4.2 MMOL/L — SIGNIFICANT CHANGE UP (ref 3.5–5.3)
RBC # BLD: 4.53 M/UL — SIGNIFICANT CHANGE UP (ref 4.2–5.8)
RBC # FLD: 11.1 % — SIGNIFICANT CHANGE UP (ref 10.3–14.5)
SODIUM SERPL-SCNC: 136 MMOL/L — SIGNIFICANT CHANGE UP (ref 135–145)
SURGICAL PATHOLOGY FINAL REPORT - CH: SIGNIFICANT CHANGE UP
WBC # BLD: 13.7 K/UL — HIGH (ref 3.8–10.5)
WBC # FLD AUTO: 13.7 K/UL — HIGH (ref 3.8–10.5)

## 2017-04-07 RX ORDER — HYDROMORPHONE HYDROCHLORIDE 2 MG/ML
2 INJECTION INTRAMUSCULAR; INTRAVENOUS; SUBCUTANEOUS EVERY 4 HOURS
Qty: 0 | Refills: 0 | Status: DISCONTINUED | OUTPATIENT
Start: 2017-04-07 | End: 2017-04-09

## 2017-04-07 RX ORDER — OXYCODONE HYDROCHLORIDE 5 MG/1
10 TABLET ORAL EVERY 4 HOURS
Qty: 0 | Refills: 0 | Status: DISCONTINUED | OUTPATIENT
Start: 2017-04-07 | End: 2017-04-09

## 2017-04-07 RX ORDER — DIPHENHYDRAMINE HCL 50 MG
50 CAPSULE ORAL EVERY 6 HOURS
Qty: 0 | Refills: 0 | Status: DISCONTINUED | OUTPATIENT
Start: 2017-04-07 | End: 2017-04-09

## 2017-04-07 RX ORDER — OXYCODONE HYDROCHLORIDE 5 MG/1
5 TABLET ORAL EVERY 4 HOURS
Qty: 0 | Refills: 0 | Status: DISCONTINUED | OUTPATIENT
Start: 2017-04-07 | End: 2017-04-09

## 2017-04-07 RX ADMIN — HYDROMORPHONE HYDROCHLORIDE 2 MILLIGRAM(S): 2 INJECTION INTRAMUSCULAR; INTRAVENOUS; SUBCUTANEOUS at 21:10

## 2017-04-07 RX ADMIN — MAGNESIUM HYDROXIDE 30 MILLILITER(S): 400 TABLET, CHEWABLE ORAL at 20:46

## 2017-04-07 RX ADMIN — Medication 100 MILLIGRAM(S): at 05:45

## 2017-04-07 RX ADMIN — Medication 6 MILLIGRAM(S): at 17:22

## 2017-04-07 RX ADMIN — SODIUM CHLORIDE 100 MILLILITER(S): 9 INJECTION, SOLUTION INTRAVENOUS at 05:50

## 2017-04-07 RX ADMIN — OXYCODONE HYDROCHLORIDE 10 MILLIGRAM(S): 5 TABLET ORAL at 18:48

## 2017-04-07 RX ADMIN — Medication 101.6 MILLIGRAM(S): at 05:46

## 2017-04-07 RX ADMIN — CYCLOBENZAPRINE HYDROCHLORIDE 10 MILLIGRAM(S): 10 TABLET, FILM COATED ORAL at 05:45

## 2017-04-07 RX ADMIN — Medication 50 MILLIGRAM(S): at 15:15

## 2017-04-07 RX ADMIN — Medication 50 MILLIGRAM(S): at 02:02

## 2017-04-07 RX ADMIN — Medication 100 MILLIGRAM(S): at 15:17

## 2017-04-07 RX ADMIN — FAMOTIDINE 20 MILLIGRAM(S): 10 INJECTION INTRAVENOUS at 03:03

## 2017-04-07 RX ADMIN — HYDROMORPHONE HYDROCHLORIDE 2 MILLIGRAM(S): 2 INJECTION INTRAMUSCULAR; INTRAVENOUS; SUBCUTANEOUS at 20:46

## 2017-04-07 RX ADMIN — Medication 50 MILLIGRAM(S): at 23:42

## 2017-04-07 NOTE — PROGRESS NOTE ADULT - SUBJECTIVE AND OBJECTIVE BOX
POD#2. Pt seen resting in chair was seen ambulating in hallway. Pt c/o sharp pain of the incisional site when walking. Pt has pain radiating to right hip to the posterior aspect of right thigh minimally improved in comparison to yesterday. Pt states he noticed leakage of urine from the huang today.     PE  Gen appearance: NAD  Motor strength: 5/5 of b/l lower ext  Sensation: intact  No calf tenderness  Incisional site with clean and dry dressing  Drain:100cc.    Plan  Afebrile, VSS  WBC(13.7H) pt is also taking Decadron taper POD#2. Pt seen resting in chair was seen ambulating in hallway. Pt c/o sharp pain of the incisional site when walking. Pt has pain radiating to right hip to the posterior aspect of right thigh minimally improved in comparison to yesterday. Pt states he noticed leakage of urine from the huang today.    PE  Gen appearance: NAD  Motor strength: 5/5 of b/l lower ext  Sensation: intact  No calf tenderness  Incisional site with clean and dry dressing  Drain:100cc.    Plan  Afebrile, VSS  WBC(13.7H) pt is also on Decadron taper--continue to monitor leukocytosis  Mobilize with physical therapy and encouraged incentive spirometer.  Inflate balloon of huang catheter as discussed with Nurse. Urine leakage may be related to deflated huang catheter. Pt reassured. Contact if leakage continues.   Keep Huang for another day. DC PCA and transition to oral and SubQ meds.  Advance diet as tolerated.   Discussed with Dr. Cortez POD#2. Pt seen resting in chair was seen ambulating in hallway. Pt c/o sharp pain of the incisional site when walking. Pt has pain radiating to right hip to the posterior aspect of right thigh minimally improved in comparison to yesterday. Pt states he noticed leakage of urine from the huang today.    PE  Gen appearance: NAD  Motor strength: 5/5 of b/l lower ext  Sensation: intact  No calf tenderness  Incisional site with clean and dry dressing  Drain:100cc.    Plan  Afebrile, VSS  WBC(13.7H) pt is also on Decadron taper--continue to monitor leukocytosis  Mobilize with physical therapy and encouraged incentive spirometer.  Inflate balloon of huang catheter as discussed with Nurse. Urine leakage may be related to deflated huang catheter. Pt reassured. Contact if leakage continues.   Keep Huang for another day. DC PCA and transition to oral and SubQ meds.  Advance diet as tolerated.   US of b/l lower extremities negative  Discussed with Dr. Cortez

## 2017-04-08 LAB
ANION GAP SERPL CALC-SCNC: 7 MMOL/L — SIGNIFICANT CHANGE UP (ref 5–17)
BASOPHILS # BLD AUTO: 0 K/UL — SIGNIFICANT CHANGE UP (ref 0–0.2)
BASOPHILS NFR BLD AUTO: 0.3 % — SIGNIFICANT CHANGE UP (ref 0–2)
BUN SERPL-MCNC: 16 MG/DL — SIGNIFICANT CHANGE UP (ref 7–23)
CALCIUM SERPL-MCNC: 9.1 MG/DL — SIGNIFICANT CHANGE UP (ref 8.5–10.1)
CHLORIDE SERPL-SCNC: 103 MMOL/L — SIGNIFICANT CHANGE UP (ref 96–108)
CO2 SERPL-SCNC: 29 MMOL/L — SIGNIFICANT CHANGE UP (ref 22–31)
CREAT SERPL-MCNC: 0.85 MG/DL — SIGNIFICANT CHANGE UP (ref 0.5–1.3)
EOSINOPHIL # BLD AUTO: 0 K/UL — SIGNIFICANT CHANGE UP (ref 0–0.5)
EOSINOPHIL NFR BLD AUTO: 0 % — SIGNIFICANT CHANGE UP (ref 0–6)
GLUCOSE SERPL-MCNC: 134 MG/DL — HIGH (ref 70–99)
HCT VFR BLD CALC: 41.8 % — SIGNIFICANT CHANGE UP (ref 39–50)
HGB BLD-MCNC: 14.2 G/DL — SIGNIFICANT CHANGE UP (ref 13–17)
LYMPHOCYTES # BLD AUTO: 1.9 K/UL — SIGNIFICANT CHANGE UP (ref 1–3.3)
LYMPHOCYTES # BLD AUTO: 12.7 % — LOW (ref 13–44)
MCHC RBC-ENTMCNC: 31.2 PG — SIGNIFICANT CHANGE UP (ref 27–34)
MCHC RBC-ENTMCNC: 34 GM/DL — SIGNIFICANT CHANGE UP (ref 32–36)
MCV RBC AUTO: 91.9 FL — SIGNIFICANT CHANGE UP (ref 80–100)
MONOCYTES # BLD AUTO: 1.2 K/UL — HIGH (ref 0–0.9)
MONOCYTES NFR BLD AUTO: 8 % — SIGNIFICANT CHANGE UP (ref 2–14)
NEUTROPHILS # BLD AUTO: 12 K/UL — HIGH (ref 1.8–7.4)
NEUTROPHILS NFR BLD AUTO: 79.1 % — HIGH (ref 43–77)
PLATELET # BLD AUTO: 254 K/UL — SIGNIFICANT CHANGE UP (ref 150–400)
POTASSIUM SERPL-MCNC: 4.9 MMOL/L — SIGNIFICANT CHANGE UP (ref 3.5–5.3)
POTASSIUM SERPL-SCNC: 4.9 MMOL/L — SIGNIFICANT CHANGE UP (ref 3.5–5.3)
RBC # BLD: 4.55 M/UL — SIGNIFICANT CHANGE UP (ref 4.2–5.8)
RBC # FLD: 11.5 % — SIGNIFICANT CHANGE UP (ref 10.3–14.5)
SODIUM SERPL-SCNC: 139 MMOL/L — SIGNIFICANT CHANGE UP (ref 135–145)
WBC # BLD: 15.2 K/UL — HIGH (ref 3.8–10.5)
WBC # FLD AUTO: 15.2 K/UL — HIGH (ref 3.8–10.5)

## 2017-04-08 RX ADMIN — HYDROMORPHONE HYDROCHLORIDE 2 MILLIGRAM(S): 2 INJECTION INTRAMUSCULAR; INTRAVENOUS; SUBCUTANEOUS at 20:09

## 2017-04-08 RX ADMIN — SENNA PLUS 2 TABLET(S): 8.6 TABLET ORAL at 22:04

## 2017-04-08 RX ADMIN — HYDROMORPHONE HYDROCHLORIDE 2 MILLIGRAM(S): 2 INJECTION INTRAMUSCULAR; INTRAVENOUS; SUBCUTANEOUS at 05:38

## 2017-04-08 RX ADMIN — Medication 50 MILLIGRAM(S): at 16:19

## 2017-04-08 RX ADMIN — HYDROMORPHONE HYDROCHLORIDE 2 MILLIGRAM(S): 2 INJECTION INTRAMUSCULAR; INTRAVENOUS; SUBCUTANEOUS at 20:39

## 2017-04-08 RX ADMIN — Medication 100 MILLIGRAM(S): at 05:39

## 2017-04-08 RX ADMIN — Medication 100 MILLIGRAM(S): at 14:35

## 2017-04-08 RX ADMIN — HYDROMORPHONE HYDROCHLORIDE 2 MILLIGRAM(S): 2 INJECTION INTRAMUSCULAR; INTRAVENOUS; SUBCUTANEOUS at 17:11

## 2017-04-08 RX ADMIN — Medication 50 MILLIGRAM(S): at 22:04

## 2017-04-08 RX ADMIN — Medication 6 MILLIGRAM(S): at 05:39

## 2017-04-08 RX ADMIN — CYCLOBENZAPRINE HYDROCHLORIDE 10 MILLIGRAM(S): 10 TABLET, FILM COATED ORAL at 09:02

## 2017-04-08 RX ADMIN — HYDROMORPHONE HYDROCHLORIDE 2 MILLIGRAM(S): 2 INJECTION INTRAMUSCULAR; INTRAVENOUS; SUBCUTANEOUS at 14:32

## 2017-04-08 RX ADMIN — Medication 100 MILLIGRAM(S): at 22:04

## 2017-04-08 RX ADMIN — CYCLOBENZAPRINE HYDROCHLORIDE 10 MILLIGRAM(S): 10 TABLET, FILM COATED ORAL at 22:04

## 2017-04-08 RX ADMIN — HYDROMORPHONE HYDROCHLORIDE 2 MILLIGRAM(S): 2 INJECTION INTRAMUSCULAR; INTRAVENOUS; SUBCUTANEOUS at 09:44

## 2017-04-08 RX ADMIN — HYDROMORPHONE HYDROCHLORIDE 2 MILLIGRAM(S): 2 INJECTION INTRAMUSCULAR; INTRAVENOUS; SUBCUTANEOUS at 06:20

## 2017-04-08 RX ADMIN — Medication 4 MILLIGRAM(S): at 19:01

## 2017-04-08 RX ADMIN — MAGNESIUM HYDROXIDE 30 MILLILITER(S): 400 TABLET, CHEWABLE ORAL at 12:13

## 2017-04-08 NOTE — PROGRESS NOTE ADULT - SUBJECTIVE AND OBJECTIVE BOX
POD#3. Pt seen resting on chair with wife at bedside. Pt c/o incisional site soreness and pain radiating to right hip and posterior aspect of right thigh and right gastroc which has slightly improved in comparison to yesterday. Lorenzo in place. Tolerating current diet. Pt states he did stairs today and was able to tolerate physical therapy sessions. He states he cannot tolerate Oxycodone which does not help with pain. He take Hydrocodone at home.     PE  Gen appearance: NAD  Motor strength: 5/5 of b/l lower extremities  Sensation: intact  Incisional site with clean and dry dressing.     Plan  VSS, Afebrile  150cc 24 hours  WBC (15.2H) from 13.7 POD#3. Pt seen resting on chair with wife at bedside. Pt c/o incisional site soreness and pain radiating to right hip and posterior aspect of right thigh and right gastroc which has slightly improved in comparison to yesterday. Huang in place. Tolerating current diet. Pt states he did stairs today and was able to tolerate physical therapy sessions. He states Oxycodone does not help with pain and had Dilaudid injection which is helping..     PE  Gen appearance: NAD  Motor strength: 5/5 of b/l lower extremities  Sensation: intact  Incisional site with clean and dry dressing.   Pt has soreness of the right gastroc region since surgery. US of b/l lower extremities post-op on 4/6/2017 negative for DVT    Plan  VSS, Afebrile  150cc 24 hours. Kept  WBC (15.2H) from 13.7 pt is on Decadron taper  Mobilize with physical therapy and incentive spirometer encouraged.   LUBNA huang. Continue current medications.   Discussed with Dr. Cortez.

## 2017-04-09 ENCOUNTER — TRANSCRIPTION ENCOUNTER (OUTPATIENT)
Age: 67
End: 2017-04-09

## 2017-04-09 VITALS
SYSTOLIC BLOOD PRESSURE: 127 MMHG | DIASTOLIC BLOOD PRESSURE: 76 MMHG | HEART RATE: 51 BPM | RESPIRATION RATE: 16 BRPM | OXYGEN SATURATION: 97 % | TEMPERATURE: 98 F

## 2017-04-09 RX ORDER — OMEGA-3 ACID ETHYL ESTERS 1 G
0 CAPSULE ORAL
Qty: 0 | Refills: 0 | COMMUNITY

## 2017-04-09 RX ORDER — SENNA PLUS 8.6 MG/1
2 TABLET ORAL
Qty: 0 | Refills: 0 | COMMUNITY
Start: 2017-04-09

## 2017-04-09 RX ORDER — MAGNESIUM HYDROXIDE 400 MG/1
30 TABLET, CHEWABLE ORAL
Qty: 0 | Refills: 0 | COMMUNITY
Start: 2017-04-09

## 2017-04-09 RX ORDER — MUPIROCIN 20 MG/G
1 OINTMENT TOPICAL
Qty: 0 | Refills: 0 | COMMUNITY

## 2017-04-09 RX ADMIN — HYDROMORPHONE HYDROCHLORIDE 2 MILLIGRAM(S): 2 INJECTION INTRAMUSCULAR; INTRAVENOUS; SUBCUTANEOUS at 07:04

## 2017-04-09 RX ADMIN — HYDROMORPHONE HYDROCHLORIDE 2 MILLIGRAM(S): 2 INJECTION INTRAMUSCULAR; INTRAVENOUS; SUBCUTANEOUS at 01:52

## 2017-04-09 RX ADMIN — Medication 4 MILLIGRAM(S): at 06:41

## 2017-04-09 RX ADMIN — HYDROMORPHONE HYDROCHLORIDE 2 MILLIGRAM(S): 2 INJECTION INTRAMUSCULAR; INTRAVENOUS; SUBCUTANEOUS at 11:32

## 2017-04-09 RX ADMIN — HYDROMORPHONE HYDROCHLORIDE 2 MILLIGRAM(S): 2 INJECTION INTRAMUSCULAR; INTRAVENOUS; SUBCUTANEOUS at 11:35

## 2017-04-09 RX ADMIN — HYDROMORPHONE HYDROCHLORIDE 2 MILLIGRAM(S): 2 INJECTION INTRAMUSCULAR; INTRAVENOUS; SUBCUTANEOUS at 01:22

## 2017-04-09 RX ADMIN — HYDROMORPHONE HYDROCHLORIDE 2 MILLIGRAM(S): 2 INJECTION INTRAMUSCULAR; INTRAVENOUS; SUBCUTANEOUS at 06:40

## 2017-04-09 RX ADMIN — Medication 100 MILLIGRAM(S): at 06:40

## 2017-04-09 NOTE — DISCHARGE NOTE ADULT - MEDICATION SUMMARY - MEDICATIONS TO STOP TAKING
I will STOP taking the medications listed below when I get home from the hospital:    Fish Oil 1200 mg oral capsule  --  by mouth once a day    mupirocin 2% topical ointment  -- Apply on skin to affected area 2 times a day

## 2017-04-09 NOTE — DISCHARGE NOTE ADULT - MEDICATION SUMMARY - MEDICATIONS TO CHANGE
I will SWITCH the dose or number of times a day I take the medications listed below when I get home from the hospital:    Vitamin C 1000 mg oral tablet, chewable  --  by mouth    multivitamin  -- 1  by mouth once a day    Vicodin HP 10 mg-660 mg oral tablet  -- 1  by mouth every 6 hours, As Needed  -- for pain

## 2017-04-09 NOTE — DISCHARGE NOTE ADULT - HOSPITAL COURSE
Patient with uncomplicated post op course  Incisional pain controlled with PCA for 2 days. Transitioned to PO pain meds  Was admitted with Lorenzo catheter which was D/C's POD #3 and then voided independently  Afeb  WBC elevated due to steroid taper post op  independenly ambulated with walker and cane  Wound clean and dry at D/C

## 2017-04-09 NOTE — DISCHARGE NOTE ADULT - CARE PLAN
Principal Discharge DX:	History of lumbar laminectomy  Goal:	independent painless ambulation  Instructions for follow-up, activity and diet:	reg diet, F/u 10 days in Dr Cortez office

## 2017-04-09 NOTE — DISCHARGE NOTE ADULT - PATIENT PORTAL LINK FT
“You can access the Follow[employer] Patient Portal, offered by [facility], by registering with the following website: [email or web address]”

## 2017-04-09 NOTE — DISCHARGE NOTE ADULT - MEDICATION SUMMARY - MEDICATIONS TO TAKE
I will START or STAY ON the medications listed below when I get home from the hospital:    Vicodin HP 10 mg-660 mg oral tablet  -- 1  by mouth every 6 hours, As Needed  -- for pain   -- Indication: For SPONDYLOLISTHESIS AT L5-S1 LEVEL/LUMBAR STENOSIS/POST LAMINECTOMY SYNDROME    magnesium hydroxide 8% oral suspension  -- 30 milliliter(s) by mouth every 12 hours, As needed, Constipation  -- Indication: For SPONDYLOLISTHESIS AT L5-S1 LEVEL/LUMBAR STENOSIS/POST LAMINECTOMY SYNDROME    senna oral tablet  -- 2 tab(s) by mouth once a day (at bedtime)  -- Indication: For SPONDYLOLISTHESIS AT L5-S1 LEVEL/LUMBAR STENOSIS/POST LAMINECTOMY SYNDROME    multivitamin  -- 1  by mouth once a day  -- Indication: For SPONDYLOLISTHESIS AT L5-S1 LEVEL/LUMBAR STENOSIS/POST LAMINECTOMY SYNDROME    Vitamin C 1000 mg oral tablet, chewable  --  by mouth   -- Indication: For SPONDYLOLISTHESIS AT L5-S1 LEVEL/LUMBAR STENOSIS/POST LAMINECTOMY SYNDROME

## 2017-04-12 DIAGNOSIS — Z96.0 PRESENCE OF UROGENITAL IMPLANTS: ICD-10-CM

## 2017-04-12 DIAGNOSIS — M48.07 SPINAL STENOSIS, LUMBOSACRAL REGION: ICD-10-CM

## 2017-04-12 DIAGNOSIS — N36.42 INTRINSIC SPHINCTER DEFICIENCY (ISD): ICD-10-CM

## 2017-04-12 DIAGNOSIS — E55.9 VITAMIN D DEFICIENCY, UNSPECIFIED: ICD-10-CM

## 2017-04-12 DIAGNOSIS — K63.5 POLYP OF COLON: ICD-10-CM

## 2017-04-12 DIAGNOSIS — Z98.890 OTHER SPECIFIED POSTPROCEDURAL STATES: ICD-10-CM

## 2017-04-12 DIAGNOSIS — J45.20 MILD INTERMITTENT ASTHMA, UNCOMPLICATED: ICD-10-CM

## 2017-04-12 DIAGNOSIS — R32 UNSPECIFIED URINARY INCONTINENCE: ICD-10-CM

## 2017-04-12 DIAGNOSIS — I10 ESSENTIAL (PRIMARY) HYPERTENSION: ICD-10-CM

## 2017-04-12 DIAGNOSIS — Z85.46 PERSONAL HISTORY OF MALIGNANT NEOPLASM OF PROSTATE: ICD-10-CM

## 2017-04-12 DIAGNOSIS — Z91.018 ALLERGY TO OTHER FOODS: ICD-10-CM

## 2017-04-12 DIAGNOSIS — M43.17 SPONDYLOLISTHESIS, LUMBOSACRAL REGION: ICD-10-CM

## 2017-04-12 DIAGNOSIS — I87.2 VENOUS INSUFFICIENCY (CHRONIC) (PERIPHERAL): ICD-10-CM

## 2017-04-12 DIAGNOSIS — E66.9 OBESITY, UNSPECIFIED: ICD-10-CM

## 2017-04-12 DIAGNOSIS — N52.9 MALE ERECTILE DYSFUNCTION, UNSPECIFIED: ICD-10-CM

## 2017-04-12 DIAGNOSIS — D72.829 ELEVATED WHITE BLOOD CELL COUNT, UNSPECIFIED: ICD-10-CM

## 2017-04-12 DIAGNOSIS — G47.33 OBSTRUCTIVE SLEEP APNEA (ADULT) (PEDIATRIC): ICD-10-CM

## 2017-04-12 DIAGNOSIS — N32.0 BLADDER-NECK OBSTRUCTION: ICD-10-CM

## 2017-04-12 DIAGNOSIS — T38.0X5A ADVERSE EFFECT OF GLUCOCORTICOIDS AND SYNTHETIC ANALOGUES, INITIAL ENCOUNTER: ICD-10-CM

## 2017-07-12 ENCOUNTER — NON-APPOINTMENT (OUTPATIENT)
Age: 67
End: 2017-07-12

## 2017-07-12 ENCOUNTER — APPOINTMENT (OUTPATIENT)
Dept: INTERNAL MEDICINE | Facility: CLINIC | Age: 67
End: 2017-07-12

## 2017-07-12 VITALS
SYSTOLIC BLOOD PRESSURE: 138 MMHG | HEIGHT: 71 IN | TEMPERATURE: 98 F | HEART RATE: 66 BPM | BODY MASS INDEX: 32.2 KG/M2 | RESPIRATION RATE: 16 BRPM | WEIGHT: 230 LBS | DIASTOLIC BLOOD PRESSURE: 88 MMHG | OXYGEN SATURATION: 97 %

## 2017-07-12 DIAGNOSIS — N32.0 BLADDER-NECK OBSTRUCTION: ICD-10-CM

## 2017-11-26 NOTE — ASU PREOP CHECKLIST - HEIGHT IN INCHES
[facility], [place], Emergency Department    [address]    [facility] [address]    Phone:  [phone]    Fax:  [phone]                                       Alvarado Huggins   MRN: [id number]    Department:  [facility], Emergency Department   Date of Visit:  11/26/2017           After Visit Summary Signature Page     I have received my discharge instructions, and my questions have been answered. I have discussed any challenges I see with this plan with the nurse or doctor.    ..........................................................................................................................................  Patient/Patient Representative Signature      ..........................................................................................................................................  Patient Representative Print Name and Relationship to Patient    ..................................................               ................................................  Date                                            Time    ..........................................................................................................................................  Reviewed by Signature/Title    ...................................................              ..............................................  Date                                                            Time           11

## 2018-02-01 ENCOUNTER — OTHER (OUTPATIENT)
Age: 68
End: 2018-02-01

## 2018-02-16 ENCOUNTER — APPOINTMENT (OUTPATIENT)
Dept: INTERNAL MEDICINE | Facility: CLINIC | Age: 68
End: 2018-02-16
Payer: MEDICARE

## 2018-02-16 VITALS
TEMPERATURE: 97.8 F | SYSTOLIC BLOOD PRESSURE: 112 MMHG | DIASTOLIC BLOOD PRESSURE: 72 MMHG | HEIGHT: 71 IN | HEART RATE: 72 BPM | BODY MASS INDEX: 32.9 KG/M2 | RESPIRATION RATE: 18 BRPM | OXYGEN SATURATION: 98 % | WEIGHT: 235 LBS

## 2018-02-16 PROCEDURE — G0438: CPT

## 2018-07-07 ENCOUNTER — EMERGENCY (EMERGENCY)
Facility: HOSPITAL | Age: 68
LOS: 0 days | Discharge: HOME | End: 2018-07-07
Attending: EMERGENCY MEDICINE
Payer: MEDICARE

## 2018-07-07 VITALS
HEART RATE: 69 BPM | DIASTOLIC BLOOD PRESSURE: 80 MMHG | WEIGHT: 214.95 LBS | OXYGEN SATURATION: 99 % | SYSTOLIC BLOOD PRESSURE: 141 MMHG | RESPIRATION RATE: 17 BRPM | TEMPERATURE: 98 F | HEIGHT: 71 IN

## 2018-07-07 VITALS
RESPIRATION RATE: 18 BRPM | DIASTOLIC BLOOD PRESSURE: 76 MMHG | HEART RATE: 72 BPM | SYSTOLIC BLOOD PRESSURE: 133 MMHG | TEMPERATURE: 98 F | OXYGEN SATURATION: 100 %

## 2018-07-07 DIAGNOSIS — Z98.890 OTHER SPECIFIED POSTPROCEDURAL STATES: Chronic | ICD-10-CM

## 2018-07-07 DIAGNOSIS — Z79.899 OTHER LONG TERM (CURRENT) DRUG THERAPY: ICD-10-CM

## 2018-07-07 DIAGNOSIS — Z98.1 ARTHRODESIS STATUS: ICD-10-CM

## 2018-07-07 DIAGNOSIS — M79.605 PAIN IN LEFT LEG: ICD-10-CM

## 2018-07-07 DIAGNOSIS — Z90.79 ACQUIRED ABSENCE OF OTHER GENITAL ORGAN(S): Chronic | ICD-10-CM

## 2018-07-07 DIAGNOSIS — M54.5 LOW BACK PAIN: ICD-10-CM

## 2018-07-07 DIAGNOSIS — Z98.890 OTHER SPECIFIED POSTPROCEDURAL STATES: ICD-10-CM

## 2018-07-07 DIAGNOSIS — Z90.79 ACQUIRED ABSENCE OF OTHER GENITAL ORGAN(S): ICD-10-CM

## 2018-07-07 DIAGNOSIS — M54.32 SCIATICA, LEFT SIDE: ICD-10-CM

## 2018-07-07 PROCEDURE — 74176 CT ABD & PELVIS W/O CONTRAST: CPT | Mod: 26

## 2018-07-07 PROCEDURE — 99284 EMERGENCY DEPT VISIT MOD MDM: CPT

## 2018-07-07 RX ORDER — IBUPROFEN 200 MG
600 TABLET ORAL ONCE
Qty: 0 | Refills: 0 | Status: COMPLETED | OUTPATIENT
Start: 2018-07-07 | End: 2018-07-07

## 2018-07-07 RX ORDER — ACETAMINOPHEN 500 MG
975 TABLET ORAL ONCE
Qty: 0 | Refills: 0 | Status: COMPLETED | OUTPATIENT
Start: 2018-07-07 | End: 2018-07-07

## 2018-07-07 RX ORDER — METHOCARBAMOL 500 MG/1
1 TABLET, FILM COATED ORAL
Qty: 9 | Refills: 0 | OUTPATIENT
Start: 2018-07-07 | End: 2018-07-09

## 2018-07-07 RX ORDER — METHOCARBAMOL 500 MG/1
750 TABLET, FILM COATED ORAL ONCE
Qty: 0 | Refills: 0 | Status: COMPLETED | OUTPATIENT
Start: 2018-07-07 | End: 2018-07-07

## 2018-07-07 RX ORDER — LIDOCAINE 4 G/100G
1 CREAM TOPICAL ONCE
Qty: 0 | Refills: 0 | Status: COMPLETED | OUTPATIENT
Start: 2018-07-07 | End: 2018-07-07

## 2018-07-07 RX ORDER — OXYCODONE HYDROCHLORIDE 5 MG/1
5 TABLET ORAL ONCE
Qty: 0 | Refills: 0 | Status: DISCONTINUED | OUTPATIENT
Start: 2018-07-07 | End: 2018-07-07

## 2018-07-07 RX ADMIN — Medication 60 MILLIGRAM(S): at 18:44

## 2018-07-07 RX ADMIN — Medication 600 MILLIGRAM(S): at 16:38

## 2018-07-07 RX ADMIN — OXYCODONE HYDROCHLORIDE 5 MILLIGRAM(S): 5 TABLET ORAL at 16:38

## 2018-07-07 RX ADMIN — OXYCODONE HYDROCHLORIDE 5 MILLIGRAM(S): 5 TABLET ORAL at 18:43

## 2018-07-07 RX ADMIN — Medication 975 MILLIGRAM(S): at 18:43

## 2018-07-07 RX ADMIN — METHOCARBAMOL 750 MILLIGRAM(S): 500 TABLET, FILM COATED ORAL at 18:43

## 2018-07-07 RX ADMIN — LIDOCAINE 1 PATCH: 4 CREAM TOPICAL at 18:24

## 2018-07-07 NOTE — ED PROVIDER NOTE - PHYSICAL EXAMINATION
Constitutional: moderate distress AAOx3  Eyes: PERRLA EOMI  Head: Normocephalic atraumatic  Mouth: MMM  Cardiac: regular rate   Resp: Lungs CTAB  GI: Abd s/nt/nd no cvat  Neuro: CN2-12 intact no saddle anesthesia  Skin: No rashes   msK: no midline back pain. + left si tenderness. + left slr. normal pulses no leg swelling

## 2018-07-07 NOTE — ED PROVIDER NOTE - PLAN OF CARE
1. return for worsening symptoms or anything concerning to you  2. take all home meds as prescribed  3. follow up with your pmd call to make an appointment  4. Take Tylenol 650 mg every 6 hours as needed for pain.  5. Take motrin 600mg PO Q6 hours prn pain  6. use lidocaine patches as directed  at pharmacy  7. Take oxycodone 5 mg every 6 hours as needed for pain; do not drink alcohol or drive while taking this medication.  8. take robaxin as directed do not drive or drink alcohol

## 2018-07-07 NOTE — ED ADULT NURSE NOTE - OBJECTIVE STATEMENT
Pt c/o L sided back pain radiating to L leg and groin region. Pt states pain came on suddenly today after playing golf. Pt has hx of spine sx in 2006. Pt states he's usually ambulatory at home, now states his pain is so severe he can't walk.

## 2018-07-07 NOTE — ED PROVIDER NOTE - OBJECTIVE STATEMENT
67 y/o m presenting to the ED c/o acute onset, left-sided, lower back pain. Pt unable to ambulate secondary to pain. 69 y/o m with PMHx of lumbar laminectomy last year presenting to the ED c/o left, lower back pain with radiation down left leg. Pt reports mild pain x3 weeks, however, today after golf pain worsened and became severe. Pt states he was walking when he felt pain shoot down his left leg. Reports mild numbness in outer toes, no numbness in the groin. Denies midline back pain, fever, recent weight loss, difficulty urinating, leg swelling, or hematuria. Denies recent trauma. Denies Hx of CA. No recent travelling or surgeries. Pt tried taking Vicodin with no relief to sx so pt came to ED. 67 y/o m with PMHx of lumbar laminectomy last year presenting to the ED c/o left, lower back pain with radiation down left leg. Pt reports mild pain x3 weeks, however, today after golf pain worsened and became severe. Pt states he was walking when he felt pain shoot down his left leg. Reports mild numbness in outer toes, no numbness in the groin. Denies midline back pain, fever, recent weight loss, difficulty urinating, leg swelling, or hematuria. Denies recent trauma. No recent travelling or surgeries. Pt tried taking Vicodin with no relief to sx so pt came to ED.

## 2018-07-07 NOTE — ED PROVIDER NOTE - CARE PLAN
Principal Discharge DX:	Sciatica of left side  Assessment and plan of treatment:	1. return for worsening symptoms or anything concerning to you  2. take all home meds as prescribed  3. follow up with your pmd call to make an appointment  4. Take Tylenol 650 mg every 6 hours as needed for pain.  5. Take motrin 600mg PO Q6 hours prn pain  6. use lidocaine patches as directed  at pharmacy  7. Take oxycodone 5 mg every 6 hours as needed for pain; do not drink alcohol or drive while taking this medication.  8. take robaxin as directed do not drive or drink alcohol

## 2018-07-07 NOTE — ED PROVIDER NOTE - PROGRESS NOTE DETAILS
pt still in a lot of pain. ct negative for stone or bony met. aorta normal caliber and no concern for dissection or aaa. still no saddle anesthesia urinating normally. likely sciatica. will continue symptom control and reassess. Jason Soliz M.D., Attending Physician patient feeling better. ambulating to the bathroom without issue. vss. Will d/c with spine follow up . Jason Soliz M.D., Attending Physician patient feeling better. ambulating to the bathroom without issue. vss. spoke at length with patient and family regarding condition. offered admission for iv pain medications if pain was intolerable but pt ambulating and wants to go home. I agree with this decision as most likely dx sciatica and symptoms will not improve by lying in hospital bed. Will d/c with spine follow up . Jason Soliz M.D., Attending Physician

## 2018-07-07 NOTE — ED PROVIDER NOTE - MEDICAL DECISION MAKING DETAILS
69 y/o m with PMHx of lumbar laminectomy last year presenting to the ED c/o left, lower back pain with radiation down left leg. Pt reports mild pain x3 weeks, however, today after golf pain worsened and became severe. Pt states he was walking when he felt pain shoot down his left leg. Reports mild numbness in outer toes, no numbness in the groin. Denies midline back pain, fever, recent weight loss, difficulty urinating, leg swelling, or hematuria. Denies recent trauma. Denies Hx of CA. No recent travelling or surgeries. Pt tried taking Vicodin with no relief to sx so pt came to ED. On exam pt with TTP over left SI joint, positive left straight leg raise, no saddle anaesthesia, normal pulses, no leg swelling, no midline spine pain, no CVA TTP. Given exam and hx, likely sciatica, low suspicion for Kidney Stone, AAA, or DVT. Will symptom control and reassess.

## 2018-07-07 NOTE — ED PROVIDER NOTE - NS ED ROS FT
Constitutional: No fever or chills  Eyes: No visual changes  HEENT: No throat pain  CV: No chest pain  Resp: No SOB no cough  GI: No abd pain, nausea or vomiting  : No dysuria  MSK: + back pain  Skin: No rash  Neuro: No headache

## 2018-07-07 NOTE — ED PROVIDER NOTE - NS_ ATTENDINGSCRIBEDETAILS _ED_A_ED_FT
I, Jason Soliz MD,  performed the initial face to face bedside interview with this patient regarding history of present illness, review of symptoms and relevant past medical, social and family history.  I completed an independent physical examination.  I was the initial provider who evaluated this patient.  The history, relevant review of systems, past medical and surgical history, medical decision making, and physical examination was documented by the scribe in my presence and I attest to the accuracy of the documentation.

## 2018-07-16 PROBLEM — G47.30 SLEEP APNEA, UNSPECIFIED: Chronic | Status: ACTIVE | Noted: 2017-02-01

## 2018-07-16 PROBLEM — C61 MALIGNANT NEOPLASM OF PROSTATE: Chronic | Status: ACTIVE | Noted: 2017-02-01

## 2018-09-13 LAB
CHOLEST SERPL-MCNC: 134
GLUCOSE SERPL-MCNC: 102
HBA1C MFR BLD HPLC: 5.8
HDLC SERPL-MCNC: 45
LDLC SERPL CALC-MCNC: 68
PSA SERPL-MCNC: 0.1

## 2018-09-14 ENCOUNTER — APPOINTMENT (OUTPATIENT)
Dept: INTERNAL MEDICINE | Facility: CLINIC | Age: 68
End: 2018-09-14
Payer: MEDICARE

## 2018-09-14 ENCOUNTER — NON-APPOINTMENT (OUTPATIENT)
Age: 68
End: 2018-09-14

## 2018-09-14 ENCOUNTER — LABORATORY RESULT (OUTPATIENT)
Age: 68
End: 2018-09-14

## 2018-09-14 VITALS
BODY MASS INDEX: 32.48 KG/M2 | OXYGEN SATURATION: 97 % | DIASTOLIC BLOOD PRESSURE: 78 MMHG | TEMPERATURE: 97.9 F | SYSTOLIC BLOOD PRESSURE: 140 MMHG | WEIGHT: 232 LBS | RESPIRATION RATE: 16 BRPM | HEIGHT: 71 IN | HEART RATE: 60 BPM

## 2018-09-14 DIAGNOSIS — Z23 ENCOUNTER FOR IMMUNIZATION: ICD-10-CM

## 2018-09-14 DIAGNOSIS — R09.82 POSTNASAL DRIP: ICD-10-CM

## 2018-09-14 PROCEDURE — 94729 DIFFUSING CAPACITY: CPT

## 2018-09-14 PROCEDURE — G0008: CPT

## 2018-09-14 PROCEDURE — 94060 EVALUATION OF WHEEZING: CPT

## 2018-09-14 PROCEDURE — 94727 GAS DIL/WSHOT DETER LNG VOL: CPT

## 2018-09-14 PROCEDURE — 99214 OFFICE O/P EST MOD 30 MIN: CPT | Mod: 25

## 2018-09-14 PROCEDURE — 90662 IIV NO PRSV INCREASED AG IM: CPT | Mod: GA

## 2018-09-14 PROCEDURE — 93000 ELECTROCARDIOGRAM COMPLETE: CPT

## 2018-09-14 RX ORDER — HYDROCODONE BITARTRATE AND ACETAMINOPHEN 10; 300 MG/1; MG/1
10-300 TABLET ORAL
Qty: 150 | Refills: 0 | Status: DISCONTINUED | COMMUNITY
Start: 2017-03-31 | End: 2018-09-14

## 2018-09-14 RX ORDER — B-COMPLEX WITH VITAMIN C
TABLET ORAL
Refills: 0 | Status: ACTIVE | COMMUNITY

## 2018-09-14 RX ORDER — PSYLLIUM HUSK 0.4 G
CAPSULE ORAL
Refills: 0 | Status: ACTIVE | COMMUNITY

## 2018-09-14 RX ORDER — CALCIUM CARBONATE 260MG(650)
TABLET,CHEWABLE ORAL
Refills: 0 | Status: ACTIVE | COMMUNITY

## 2018-09-14 RX ORDER — MULTIVIT-MIN/IRON/FOLIC ACID/K 18-600-40
CAPSULE ORAL
Refills: 0 | Status: ACTIVE | COMMUNITY

## 2018-09-14 RX ORDER — ACETAMINOPHEN 650 MG/1
650 TABLET, FILM COATED, EXTENDED RELEASE ORAL
Refills: 0 | Status: ACTIVE | COMMUNITY

## 2018-09-14 RX ORDER — MULTIVITAMIN
TABLET ORAL DAILY
Refills: 0 | Status: ACTIVE | COMMUNITY

## 2018-09-14 RX ORDER — HYDROCODONE BITARTRATE AND ACETAMINOPHEN 10; 300 MG/1; MG/1
10-300 TABLET ORAL
Refills: 0 | Status: DISCONTINUED | COMMUNITY
End: 2018-09-14

## 2018-09-14 RX ORDER — TRAZODONE HYDROCHLORIDE 50 MG/1
50 TABLET ORAL
Qty: 30 | Refills: 11 | Status: DISCONTINUED | COMMUNITY
Start: 2017-07-12 | End: 2018-09-14

## 2018-09-14 NOTE — PHYSICAL EXAM
[General Appearance - Alert] : alert [General Appearance - In No Acute Distress] : in no acute distress [Neck Appearance] : the appearance of the neck was normal [Neck Cervical Mass (___cm)] : no neck mass was observed [Jugular Venous Distention Increased] : there was no jugular-venous distention [Thyroid Diffuse Enlargement] : the thyroid was not enlarged [Thyroid Nodule] : there were no palpable thyroid nodules [Auscultation Breath Sounds / Voice Sounds] : lungs were clear to auscultation bilaterally [Heart Rate And Rhythm] : heart rate was normal and rhythm regular [Heart Sounds] : normal S1 and S2 [Heart Sounds Gallop] : no gallops [Murmurs] : no murmurs [Heart Sounds Pericardial Friction Rub] : no pericardial rub [FreeTextEntry1] : Carotids are 1-2+ bilaterally. There is 1+ edema on the left, and trace edema on the right lower extremity [Bowel Sounds] : normal bowel sounds [Abdomen Soft] : soft [Abdomen Tenderness] : non-tender [] : no hepato-splenomegaly [Abdomen Mass (___ Cm)] : no abdominal mass palpated [Cervical Lymph Nodes Enlarged Posterior Bilaterally] : posterior cervical [Cervical Lymph Nodes Enlarged Anterior Bilaterally] : anterior cervical [Supraclavicular Lymph Nodes Enlarged Bilaterally] : supraclavicular [Nail Clubbing] : no clubbing  or cyanosis of the fingernails

## 2018-09-14 NOTE — DATA REVIEWED
[FreeTextEntry1] : A pulmonary function test is performed. Lung volumes reveal a normal total lung capacity and residual volume. The vital capacity and FRC are mildly reduced. Lung mechanics are within normal limits. Probable bilateral reactive is not demonstrated. The DLCO and saturation maintained. This represents a mild degree of restriction. Bronchodilator reactivity is not present.\par \par EKG shows sinus rhythm at a rate of 63. Normal intervals and axis. There are no acute ST-T wave changes noted.\par \par Blood work has not yet been performed by the patient.

## 2018-09-14 NOTE — PLAN
[FreeTextEntry1] : 1. At this time will observe without medications.\par \par 2. Dietary discretion with regards to carbohydrate intake and weight loss has been stressed.\par \par 3. Flu shot has been administered.\par \par 4. The new shingles vaccine has been recommended. He has been given a prescription.\par \par 5. Blood work to be performed in the next several days to include a basic metabolic panel and hemoglobin A1c.\par \par 6. Followup with myself in 6 months with a wellness evaluation.

## 2018-09-14 NOTE — HISTORY OF PRESENT ILLNESS
[de-identified] : The patient comes in today for a followup evaluation and reassessment. There is several issues to discuss.\par \par With regards to his chronic back issues, he states that he is doing fairly well at this time. He did have a recent flareup which required a visit to the emergency room. He then followed up with his spine surgeon, Dr. Cortez. He underwent 2 epidural injections, and he did have good results. He now occasionally uses Tylenol his discomfort intensifies at the end of the day. He denies having any radicular type pain or paresthesias.\par \par The patient states that he has not been compliant with dietary discretion. He does not curb his carbohydrates. His weight has been stable.\par \par With regards to his underlying airway reactivity, he states that he has been stable. He denies shortness of breath or wheezing. He has not had any cough. He denies allergy symptoms. He did have a postnasal drip, which resolved with the institution of Flonase.\par \par With regards to his obstructive sleep apnea, he refuses any therapy, and he asked to discontinue the use of his oral appliance. He now comes in for this assessment.

## 2018-09-16 PROBLEM — N35.9 URETHRAL STRICTURE, UNSPECIFIED: Chronic | Status: ACTIVE | Noted: 2017-03-28

## 2018-09-16 PROBLEM — M47.817 SPONDYLOSIS WITHOUT MYELOPATHY OR RADICULOPATHY, LUMBOSACRAL REGION: Chronic | Status: ACTIVE | Noted: 2017-02-01

## 2018-09-16 PROBLEM — J30.2 OTHER SEASONAL ALLERGIC RHINITIS: Chronic | Status: ACTIVE | Noted: 2017-03-28

## 2018-09-16 PROBLEM — M48.06 SPINAL STENOSIS, LUMBAR REGION: Chronic | Status: ACTIVE | Noted: 2017-03-28

## 2018-09-16 PROBLEM — I86.8 VARICOSE VEINS OF OTHER SPECIFIED SITES: Chronic | Status: ACTIVE | Noted: 2017-03-28

## 2018-09-16 PROBLEM — M54.30 SCIATICA, UNSPECIFIED SIDE: Chronic | Status: ACTIVE | Noted: 2017-03-28

## 2018-09-18 ENCOUNTER — RESULT REVIEW (OUTPATIENT)
Age: 68
End: 2018-09-18

## 2018-10-13 LAB
24R-OH-CALCIDIOL SERPL-MCNC: 86.2 PG/ML
ALBUMIN SERPL ELPH-MCNC: 4 G/DL
ALP BLD-CCNC: 48 U/L
ALT SERPL-CCNC: 16 U/L
ANION GAP SERPL CALC-SCNC: 12 MMOL/L
APPEARANCE: CLEAR
AST SERPL-CCNC: 23 U/L
BACTERIA: NEGATIVE
BASOPHILS # BLD AUTO: 0.02 K/UL
BASOPHILS NFR BLD AUTO: 0.4 %
BILIRUB SERPL-MCNC: 0.4 MG/DL
BILIRUBIN URINE: NEGATIVE
BLOOD URINE: NEGATIVE
BUN SERPL-MCNC: 16 MG/DL
CALCIUM SERPL-MCNC: 8.9 MG/DL
CHLORIDE SERPL-SCNC: 107 MMOL/L
CHOLEST SERPL-MCNC: 122 MG/DL
CHOLEST/HDLC SERPL: 2.9 RATIO
CO2 SERPL-SCNC: 24 MMOL/L
COLOR: YELLOW
CREAT SERPL-MCNC: 0.83 MG/DL
EOSINOPHIL # BLD AUTO: 0.15 K/UL
EOSINOPHIL NFR BLD AUTO: 2.6 %
ESTIMATED AVERAGE GLUCOSE: 123 MG/DL
GLUCOSE QUALITATIVE U: NEGATIVE MG/DL
GLUCOSE SERPL-MCNC: 104 MG/DL
HBA1C MFR BLD HPLC: 5.9 %
HCT VFR BLD CALC: 41.6 %
HDLC SERPL-MCNC: 42 MG/DL
HGB BLD-MCNC: 13.3 G/DL
IMM GRANULOCYTES NFR BLD AUTO: 0 %
KETONES URINE: NEGATIVE
LDLC SERPL CALC-MCNC: 71 MG/DL
LEUKOCYTE ESTERASE URINE: NEGATIVE
LYMPHOCYTES # BLD AUTO: 2.12 K/UL
LYMPHOCYTES NFR BLD AUTO: 37.1 %
MAN DIFF?: NORMAL
MCHC RBC-ENTMCNC: 29.9 PG
MCHC RBC-ENTMCNC: 32 GM/DL
MCV RBC AUTO: 93.5 FL
MICROSCOPIC-UA: NORMAL
MONOCYTES # BLD AUTO: 0.48 K/UL
MONOCYTES NFR BLD AUTO: 8.4 %
NEUTROPHILS # BLD AUTO: 2.94 K/UL
NEUTROPHILS NFR BLD AUTO: 51.5 %
NITRITE URINE: NEGATIVE
PH URINE: 6
PLATELET # BLD AUTO: 265 K/UL
POTASSIUM SERPL-SCNC: 5 MMOL/L
PROT SERPL-MCNC: 6.4 G/DL
PROTEIN URINE: NEGATIVE MG/DL
PSA SERPL-MCNC: <0.01 NG/ML
RBC # BLD: 4.45 M/UL
RBC # FLD: 13.6 %
RED BLOOD CELLS URINE: 1 /HPF
SODIUM SERPL-SCNC: 143 MMOL/L
SPECIFIC GRAVITY URINE: 1.02
SQUAMOUS EPITHELIAL CELLS: 0 /HPF
T3FREE SERPL-MCNC: 2.84 PG/ML
T3RU NFR SERPL: 1.07 INDEX
T4 FREE SERPL-MCNC: 0.9 NG/DL
T4 SERPL-MCNC: 5.5 UG/DL
TRIGL SERPL-MCNC: 46 MG/DL
TSH SERPL-ACNC: 2.08 UIU/ML
UROBILINOGEN URINE: NEGATIVE MG/DL
WBC # FLD AUTO: 5.71 K/UL
WHITE BLOOD CELLS URINE: 0 /HPF

## 2018-10-18 NOTE — PATIENT PROFILE ADULT. - LIVING ARRANGEMENTS, TEMPORARY FAMILY, PROFILE
Pt is here for her scheduled day#2/5 Venofer. PIV established without challenges - venofer administered as a slow push followed by 30min observation. Treatment well tolerated. Discharged home to self care. Returns Saturday.   none required

## 2019-02-27 ENCOUNTER — LABORATORY RESULT (OUTPATIENT)
Age: 69
End: 2019-02-27

## 2019-02-28 LAB
24R-OH-CALCIDIOL SERPL-MCNC: 72.4 PG/ML
ALBUMIN SERPL ELPH-MCNC: 4.5 G/DL
ALP BLD-CCNC: 61 U/L
ALT SERPL-CCNC: 23 U/L
ANION GAP SERPL CALC-SCNC: 11 MMOL/L
APPEARANCE: CLEAR
AST SERPL-CCNC: 18 U/L
BACTERIA: NEGATIVE
BASOPHILS # BLD AUTO: 0.03 K/UL
BASOPHILS NFR BLD AUTO: 0.5 %
BILIRUB SERPL-MCNC: 0.5 MG/DL
BILIRUBIN URINE: NEGATIVE
BLOOD URINE: NEGATIVE
BUN SERPL-MCNC: 19 MG/DL
CALCIUM SERPL-MCNC: 9.2 MG/DL
CHLORIDE SERPL-SCNC: 105 MMOL/L
CHOLEST SERPL-MCNC: 152 MG/DL
CHOLEST/HDLC SERPL: 2.8 RATIO
CO2 SERPL-SCNC: 26 MMOL/L
COLOR: YELLOW
CREAT SERPL-MCNC: 0.97 MG/DL
EOSINOPHIL # BLD AUTO: 0.22 K/UL
EOSINOPHIL NFR BLD AUTO: 3.5 %
ESTIMATED AVERAGE GLUCOSE: 123 MG/DL
GLUCOSE QUALITATIVE U: NEGATIVE
GLUCOSE SERPL-MCNC: 116 MG/DL
HBA1C MFR BLD HPLC: 5.9 %
HCT VFR BLD CALC: 47.6 %
HDLC SERPL-MCNC: 55 MG/DL
HGB BLD-MCNC: 15.2 G/DL
HYALINE CASTS: 1 /LPF
IMM GRANULOCYTES NFR BLD AUTO: 0.2 %
KETONES URINE: NEGATIVE
LDLC SERPL CALC-MCNC: 81 MG/DL
LEUKOCYTE ESTERASE URINE: NEGATIVE
LYMPHOCYTES # BLD AUTO: 2.68 K/UL
LYMPHOCYTES NFR BLD AUTO: 42.4 %
MAN DIFF?: NORMAL
MCHC RBC-ENTMCNC: 30 PG
MCHC RBC-ENTMCNC: 31.9 GM/DL
MCV RBC AUTO: 94.1 FL
MICROSCOPIC-UA: NORMAL
MONOCYTES # BLD AUTO: 0.56 K/UL
MONOCYTES NFR BLD AUTO: 8.9 %
NEUTROPHILS # BLD AUTO: 2.82 K/UL
NEUTROPHILS NFR BLD AUTO: 44.5 %
NITRITE URINE: NEGATIVE
PH URINE: 5.5
PLATELET # BLD AUTO: 254 K/UL
POTASSIUM SERPL-SCNC: 5.2 MMOL/L
PROT SERPL-MCNC: 6.6 G/DL
PROTEIN URINE: NORMAL
RBC # BLD: 5.06 M/UL
RBC # FLD: 12.6 %
RED BLOOD CELLS URINE: 2 /HPF
SODIUM SERPL-SCNC: 142 MMOL/L
SPECIFIC GRAVITY URINE: 1.03
SQUAMOUS EPITHELIAL CELLS: 1 /HPF
T3FREE SERPL-MCNC: 2.99 PG/ML
T3RU NFR SERPL: 1.1 TBI
T4 FREE SERPL-MCNC: 1 NG/DL
T4 SERPL-MCNC: 5.5 UG/DL
TRIGL SERPL-MCNC: 78 MG/DL
TSH SERPL-ACNC: 2.61 UIU/ML
UROBILINOGEN URINE: NORMAL
WBC # FLD AUTO: 6.32 K/UL
WHITE BLOOD CELLS URINE: 1 /HPF

## 2019-03-25 ENCOUNTER — FORM ENCOUNTER (OUTPATIENT)
Age: 69
End: 2019-03-25

## 2019-03-26 ENCOUNTER — APPOINTMENT (OUTPATIENT)
Dept: INTERNAL MEDICINE | Facility: CLINIC | Age: 69
End: 2019-03-26
Payer: MEDICARE

## 2019-03-26 ENCOUNTER — OUTPATIENT (OUTPATIENT)
Dept: OUTPATIENT SERVICES | Facility: HOSPITAL | Age: 69
LOS: 1 days | End: 2019-03-26
Payer: MEDICARE

## 2019-03-26 ENCOUNTER — APPOINTMENT (OUTPATIENT)
Dept: ULTRASOUND IMAGING | Facility: CLINIC | Age: 69
End: 2019-03-26
Payer: MEDICARE

## 2019-03-26 VITALS
SYSTOLIC BLOOD PRESSURE: 130 MMHG | DIASTOLIC BLOOD PRESSURE: 88 MMHG | RESPIRATION RATE: 16 BRPM | HEART RATE: 64 BPM | BODY MASS INDEX: 33.18 KG/M2 | WEIGHT: 237 LBS | OXYGEN SATURATION: 97 % | HEIGHT: 71 IN | TEMPERATURE: 98.2 F

## 2019-03-26 DIAGNOSIS — Z98.890 OTHER SPECIFIED POSTPROCEDURAL STATES: Chronic | ICD-10-CM

## 2019-03-26 DIAGNOSIS — Z00.8 ENCOUNTER FOR OTHER GENERAL EXAMINATION: ICD-10-CM

## 2019-03-26 DIAGNOSIS — Z90.79 ACQUIRED ABSENCE OF OTHER GENITAL ORGAN(S): Chronic | ICD-10-CM

## 2019-03-26 LAB — PSA SERPL-MCNC: <0.01 NG/ML

## 2019-03-26 PROCEDURE — 93880 EXTRACRANIAL BILAT STUDY: CPT

## 2019-03-26 PROCEDURE — 93880 EXTRACRANIAL BILAT STUDY: CPT | Mod: 26

## 2019-03-26 PROCEDURE — G0439: CPT

## 2019-03-26 RX ORDER — GABAPENTIN 300 MG/1
300 CAPSULE ORAL 3 TIMES DAILY
Refills: 0 | Status: DISCONTINUED | COMMUNITY
End: 2019-03-26

## 2019-03-26 RX ORDER — PREGABALIN 150 MG/1
150 CAPSULE ORAL TWICE DAILY
Qty: 60 | Refills: 0 | Status: ACTIVE | COMMUNITY
Start: 2019-03-26

## 2019-03-26 NOTE — PLAN
[FreeTextEntry1] : 1. Continue with regimen as outlined above.\par \par 2. PSA to be obtained today.\par \par 3. Urologic followup with Dr. Myers regarding his history of prostate cancer.\par \par 4. The patient has been referred back to his cardiologist, Dr. Gutierrez for further evaluation of his symptoms including postural dizziness.\par \par 5. Carotid duplex Dopplers will be performed.\par \par 6. The patient will obtain a second dose of the new shingles vaccine between May and September.\par \par 7. Diet weight loss and exercise.\par \par 8. Followup in 6 months with full pulmonary function testing and EKG.

## 2019-03-26 NOTE — HEALTH RISK ASSESSMENT
[Good] : ~his/her~ current health as good [0] : 2) Feeling down, depressed, or hopeless: Not at all (0) [Retired] : retired [] :  [Smoke Detector] : smoke detector [Carbon Monoxide Detector] : carbon monoxide detector [Seat Belt] :  uses seat belt [Sunscreen] : uses sunscreen [] : No [de-identified] : occasional [Guns at Home] : no guns at home

## 2019-03-26 NOTE — PHYSICAL EXAM
[General Appearance - Alert] : alert [General Appearance - In No Acute Distress] : in no acute distress [Sclera] : the sclera and conjunctiva were normal [PERRL With Normal Accommodation] : pupils were equal in size, round, and reactive to light [Extraocular Movements] : extraocular movements were intact [Neck Appearance] : the appearance of the neck was normal [Neck Cervical Mass (___cm)] : no neck mass was observed [Jugular Venous Distention Increased] : there was no jugular-venous distention [Thyroid Diffuse Enlargement] : the thyroid was not enlarged [Thyroid Nodule] : there were no palpable thyroid nodules [Auscultation Breath Sounds / Voice Sounds] : lungs were clear to auscultation bilaterally [Heart Rate And Rhythm] : heart rate was normal and rhythm regular [Heart Sounds] : normal S1 and S2 [Heart Sounds Gallop] : no gallops [Murmurs] : no murmurs [Heart Sounds Pericardial Friction Rub] : no pericardial rub [Bowel Sounds] : normal bowel sounds [Abdomen Soft] : soft [Abdomen Tenderness] : non-tender [] : no hepato-splenomegaly [Abdomen Mass (___ Cm)] : no abdominal mass palpated [Cervical Lymph Nodes Enlarged Posterior Bilaterally] : posterior cervical [Cervical Lymph Nodes Enlarged Anterior Bilaterally] : anterior cervical [Supraclavicular Lymph Nodes Enlarged Bilaterally] : supraclavicular [No CVA Tenderness] : no ~M costovertebral angle tenderness [Nail Clubbing] : no clubbing  or cyanosis of the fingernails [FreeTextEntry1] : Evidence of sun damage

## 2019-03-26 NOTE — HISTORY OF PRESENT ILLNESS
[de-identified] : Patient comes in today for a wellness evaluation.\par \par Overall, the patient states that he has been fairly stable. His main issue continues to be that of chronic back pain. He continues to be followed carefully by his orthopedic spine surgeon, Dr. Cortez. He did undergo a procedure recently which may have been a facet block. He has been having issues with radicular pain down his left leg. He was switched from Appendix and, over to Lyrica. He has noted a significant improvement with this medication.\par \par The patient states that he has now been working. He has been intermittently complaining of slight dizziness and lightheadedness upon bending down. He states that it occurs approximately 5-6 hours after eating his last meal. He usually does not eat lunch while at work. He states that he is being hydrated fairly decently. He denies any chest pain or pressure.\par \par The patient has not been able to lose weight. He has not been performing any form of exercise. He continues to refuse therapy for sleep apnea and he is not using an oral appliance. His wife notes that he occasionally does stop breathing while in bed sleeping. He denies any change in bowel habits. He now comes in for this assessment.

## 2019-10-22 ENCOUNTER — APPOINTMENT (OUTPATIENT)
Dept: INTERNAL MEDICINE | Facility: CLINIC | Age: 69
End: 2019-10-22
Payer: MEDICARE

## 2019-10-22 ENCOUNTER — NON-APPOINTMENT (OUTPATIENT)
Age: 69
End: 2019-10-22

## 2019-10-22 VITALS
DIASTOLIC BLOOD PRESSURE: 84 MMHG | HEART RATE: 70 BPM | WEIGHT: 230 LBS | OXYGEN SATURATION: 99 % | SYSTOLIC BLOOD PRESSURE: 130 MMHG | BODY MASS INDEX: 32.2 KG/M2 | RESPIRATION RATE: 18 BRPM | HEIGHT: 71 IN | TEMPERATURE: 98.8 F

## 2019-10-22 DIAGNOSIS — M48.062 SPINAL STENOSIS, LUMBAR REGION WITH NEUROGENIC CLAUDICATION: ICD-10-CM

## 2019-10-22 PROCEDURE — ZZZZZ: CPT

## 2019-10-22 PROCEDURE — 94060 EVALUATION OF WHEEZING: CPT

## 2019-10-22 PROCEDURE — 99214 OFFICE O/P EST MOD 30 MIN: CPT | Mod: 25

## 2019-10-22 PROCEDURE — 93000 ELECTROCARDIOGRAM COMPLETE: CPT

## 2019-10-22 PROCEDURE — 94729 DIFFUSING CAPACITY: CPT

## 2019-10-22 PROCEDURE — 94727 GAS DIL/WSHOT DETER LNG VOL: CPT

## 2019-10-22 NOTE — PLAN
[FreeTextEntry1] : 1. Continue with medication as outlined above.\par \par 2. Cardiovascular exercise regimen was recommended.\par \par 3. Follow up with Dr. Cortez in orthopedics to monitor lower back pain.\par \par 4. Follow up with Dr. Goldberg in urology for routine medical evaluation.\par \par 5. Follow up with Dr. Gutierrez in cardiology for routine medial evaluation.\par \par 6. Follow up with myself in 6 months for a wellness evaluation and routine fasting blood work.

## 2019-10-22 NOTE — END OF VISIT
[FreeTextEntry3] : All medical record entries made by the scribe were at my, Dr. Augie Childers, direction and personally dictated by me on 10/22/2019. I have reviewed the chart and agree that the record accurately reflects my personal performance of the history, physical exam, assessment and plan. I have also personally directed, reviewed, and agreed with the chart.

## 2019-10-22 NOTE — PHYSICAL EXAM
[General Appearance - In No Acute Distress] : in no acute distress [General Appearance - Alert] : alert [Neck Cervical Mass (___cm)] : no neck mass was observed [Neck Appearance] : the appearance of the neck was normal [Jugular Venous Distention Increased] : there was no jugular-venous distention [Thyroid Nodule] : there were no palpable thyroid nodules [Thyroid Diffuse Enlargement] : the thyroid was not enlarged [Heart Rate And Rhythm] : heart rate was normal and rhythm regular [Auscultation Breath Sounds / Voice Sounds] : lungs were clear to auscultation bilaterally [Heart Sounds] : normal S1 and S2 [Murmurs] : no murmurs [Heart Sounds Gallop] : no gallops [Heart Sounds Pericardial Friction Rub] : no pericardial rub [Bowel Sounds] : normal bowel sounds [Abdomen Soft] : soft [Abdomen Tenderness] : non-tender [] : no hepato-splenomegaly [Abdomen Mass (___ Cm)] : no abdominal mass palpated [Cervical Lymph Nodes Enlarged Posterior Bilaterally] : posterior cervical [Cervical Lymph Nodes Enlarged Anterior Bilaterally] : anterior cervical [Nail Clubbing] : no clubbing  or cyanosis of the fingernails [Supraclavicular Lymph Nodes Enlarged Bilaterally] : supraclavicular [FreeTextEntry1] : Carotids are 1-2+ bilaterally. There is 1+ edema on the left, and trace edema on the right lower extremity

## 2019-10-22 NOTE — ADDENDUM
[FreeTextEntry1] : I, Yosvany Loving, acted solely as a scribe for Dr. Augie Childers on this date 10/22/2019.

## 2019-10-22 NOTE — HISTORY OF PRESENT ILLNESS
[de-identified] : This patient comes in today for a follow up evaluation. There are several issues to discuss.\par \par Overall, he states that he is doing relatively well. He is no longer experiencing episodes of dizziness. A carotid duplex study was performed by Dr. Gutierrez earlier this year and the results were negative.\par \par Dr. Goldberg, his urologist, monitors his  health. He has no new issues to report. His PSAs, have been undetectable, with regards to following his prostate carcinoma.\par \par He suffers from chronic back pain, which is monitored by Dr. Cortez in orthopedics. He is currently taking flexaril, 10 mg daily, and lyrica, 150 mg b.i.d, for management.\par \par Currently, he does not use a nocturnal device for his sleep apnea. He denies coughing, wheezing, SOB, phlegm, and mucous.\par \par Today he comes in for this assessment.

## 2020-04-06 ENCOUNTER — APPOINTMENT (OUTPATIENT)
Dept: INTERNAL MEDICINE | Facility: CLINIC | Age: 70
End: 2020-04-06

## 2020-05-12 ENCOUNTER — LABORATORY RESULT (OUTPATIENT)
Age: 70
End: 2020-05-12

## 2020-05-12 LAB
25(OH)D3 SERPL-MCNC: 28.8 NG/ML
ALBUMIN SERPL ELPH-MCNC: 4.3 G/DL
ALP BLD-CCNC: 54 U/L
ALT SERPL-CCNC: 17 U/L
ANION GAP SERPL CALC-SCNC: 11 MMOL/L
APPEARANCE: CLEAR
AST SERPL-CCNC: 21 U/L
BACTERIA: NEGATIVE
BASOPHILS # BLD AUTO: 0.02 K/UL
BASOPHILS NFR BLD AUTO: 0.3 %
BILIRUB SERPL-MCNC: 0.6 MG/DL
BILIRUBIN URINE: NEGATIVE
BLOOD URINE: NEGATIVE
BUN SERPL-MCNC: 18 MG/DL
CALCIUM SERPL-MCNC: 9.3 MG/DL
CHLORIDE SERPL-SCNC: 104 MMOL/L
CHOLEST SERPL-MCNC: 156 MG/DL
CHOLEST/HDLC SERPL: 3.1 RATIO
CO2 SERPL-SCNC: 26 MMOL/L
COLOR: YELLOW
CREAT SERPL-MCNC: 0.93 MG/DL
EOSINOPHIL # BLD AUTO: 0.22 K/UL
EOSINOPHIL NFR BLD AUTO: 3.6 %
GLUCOSE QUALITATIVE U: NEGATIVE
GLUCOSE SERPL-MCNC: 106 MG/DL
HCT VFR BLD CALC: 46.6 %
HDLC SERPL-MCNC: 50 MG/DL
HGB BLD-MCNC: 15 G/DL
HYALINE CASTS: 1 /LPF
IMM GRANULOCYTES NFR BLD AUTO: 0.2 %
KETONES URINE: NEGATIVE
LDLC SERPL CALC-MCNC: 84 MG/DL
LEUKOCYTE ESTERASE URINE: NEGATIVE
LYMPHOCYTES # BLD AUTO: 2.69 K/UL
LYMPHOCYTES NFR BLD AUTO: 44.2 %
MAN DIFF?: NORMAL
MCHC RBC-ENTMCNC: 30.2 PG
MCHC RBC-ENTMCNC: 32.2 GM/DL
MCV RBC AUTO: 94 FL
MICROSCOPIC-UA: NORMAL
MONOCYTES # BLD AUTO: 0.51 K/UL
MONOCYTES NFR BLD AUTO: 8.4 %
NEUTROPHILS # BLD AUTO: 2.63 K/UL
NEUTROPHILS NFR BLD AUTO: 43.3 %
NITRITE URINE: NEGATIVE
PH URINE: 6
PLATELET # BLD AUTO: 271 K/UL
POTASSIUM SERPL-SCNC: 5 MMOL/L
PROT SERPL-MCNC: 6.5 G/DL
PROTEIN URINE: NEGATIVE
PSA SERPL-MCNC: <0.01 NG/ML
RBC # BLD: 4.96 M/UL
RBC # FLD: 12.8 %
RED BLOOD CELLS URINE: 1 /HPF
SODIUM SERPL-SCNC: 141 MMOL/L
SPECIFIC GRAVITY URINE: 1.02
SQUAMOUS EPITHELIAL CELLS: 0 /HPF
T3FREE SERPL-MCNC: 2.62 PG/ML
T3RU NFR SERPL: 1.1 TBI
T4 FREE SERPL-MCNC: 1 NG/DL
T4 SERPL-MCNC: 5.6 UG/DL
TRIGL SERPL-MCNC: 109 MG/DL
TSH SERPL-ACNC: 2.21 UIU/ML
UROBILINOGEN URINE: NORMAL
WBC # FLD AUTO: 6.08 K/UL
WHITE BLOOD CELLS URINE: 1 /HPF

## 2020-05-19 ENCOUNTER — APPOINTMENT (OUTPATIENT)
Dept: INTERNAL MEDICINE | Facility: CLINIC | Age: 70
End: 2020-05-19
Payer: MEDICARE

## 2020-05-19 VITALS
BODY MASS INDEX: 32.2 KG/M2 | RESPIRATION RATE: 18 BRPM | HEART RATE: 62 BPM | HEIGHT: 71 IN | SYSTOLIC BLOOD PRESSURE: 128 MMHG | DIASTOLIC BLOOD PRESSURE: 82 MMHG | WEIGHT: 230 LBS | OXYGEN SATURATION: 97 % | TEMPERATURE: 97.9 F

## 2020-05-19 PROCEDURE — G0439: CPT

## 2020-05-19 NOTE — PHYSICAL EXAM
[Normal Affect] : the affect was normal [Normal Insight/Judgement] : insight and judgment were intact [General Appearance - In No Acute Distress] : in no acute distress [General Appearance - Alert] : alert [PERRL With Normal Accommodation] : pupils were equal in size, round, and reactive to light [Sclera] : the sclera and conjunctiva were normal [Extraocular Movements] : extraocular movements were intact [Neck Appearance] : the appearance of the neck was normal [Neck Cervical Mass (___cm)] : no neck mass was observed [Jugular Venous Distention Increased] : there was no jugular-venous distention [Thyroid Diffuse Enlargement] : the thyroid was not enlarged [Thyroid Nodule] : there were no palpable thyroid nodules [Auscultation Breath Sounds / Voice Sounds] : lungs were clear to auscultation bilaterally [Heart Rate And Rhythm] : heart rate was normal and rhythm regular [Heart Sounds] : normal S1 and S2 [Heart Sounds Gallop] : no gallops [Murmurs] : no murmurs [Heart Sounds Pericardial Friction Rub] : no pericardial rub [Bowel Sounds] : normal bowel sounds [Abdomen Tenderness] : non-tender [Abdomen Soft] : soft [] : no hepato-splenomegaly [Abdomen Mass (___ Cm)] : no abdominal mass palpated [Cervical Lymph Nodes Enlarged Posterior Bilaterally] : posterior cervical [Cervical Lymph Nodes Enlarged Anterior Bilaterally] : anterior cervical [Supraclavicular Lymph Nodes Enlarged Bilaterally] : supraclavicular [No CVA Tenderness] : no ~M costovertebral angle tenderness [Nail Clubbing] : no clubbing  or cyanosis of the fingernails [FreeTextEntry1] : Evidence of sun damage. seborrheic keratoses are present

## 2020-05-19 NOTE — HISTORY OF PRESENT ILLNESS
[de-identified] : The patient comes in today for a wellness evaluation.\par \par Overall, the patient is fairly stable. His major complaint at this time, is that of recurrent low back pain. The patient has had chronic issues with his lower back. He has undergone C-spine procedures in the past. He was scheduled to undergo the placement of a temporary dorsal nerve stimulator, but it was not placed due to the corona virus epidemic. He remains on Lyrica and Flexeril. He noted a significant worsening in his chronic pain over the past 48 hours. He has recently required the use of Vicodin as well. He is not having any paresthesias. He denies any urinary incontinence or incontinence of stool.\par \par The patient states that he is otherwise stable. He has lost several pounds since last visit. He has been seen by his urologist regarding his history of prostate cancer. He has not been seen by his cardiologist recently. He is complaining of dry skin. His appetite has been very good. He denies any change in bowel habits. He is due to undergo repeat colonoscopy this year. He now comes in for this assessment.

## 2020-05-19 NOTE — HEALTH RISK ASSESSMENT
[Yes] : Yes [Monthly or less (1 pt)] : Monthly or less (1 point) [3 or 4 (1 pt)] : 3 or 4  (1 point) [Never (0 pts)] : Never (0 points) [No] : In the past 12 months have you used drugs other than those required for medical reasons? No [0] : 2) Feeling down, depressed, or hopeless: Not at all (0) [Retired] : retired [Carbon Monoxide Detector] : carbon monoxide detector [Smoke Detector] : smoke detector [Guns at Home] : guns at home [Safety elements used in home] : safety elements used in home [Seat Belt] :  uses seat belt [Sunscreen] : uses sunscreen [de-identified] : occasional [] : No

## 2020-05-19 NOTE — REVIEW OF SYSTEMS
[Back Pain] : back pain [Negative] : Heme/Lymph [FreeTextEntry9] : see history of present illness [de-identified] : dry skin

## 2020-05-19 NOTE — PLAN
[FreeTextEntry1] : 1. Continue medication as outlined above.\par \par 2. Spine followup and reevaluation with Dr. Cortez. They will consider placement of a dorsal column nerve stimulator.\par \par 3. The patient has been told to contact his gastroenterologist, Dr. Lawson regarding the timing of his next colonoscopy which is due this year.\par \par 4. Routine cardiology followup with Dr. Gutierrez.\par \par 5. The patient has been referred to Dr. Looney for dermatologic evaluation\par \par 6. Followup in 6 months with full pulmonary function testing and flu shot.

## 2020-08-20 NOTE — ASU PREOP CHECKLIST - MUPIRONCIN COMMENTS
-- DO NOT REPLY / DO NOT REPLY ALL --  -- Message is from the [facility]--    COVID-19 Universal Screening: N/A - Not about scheduling    General Patient Message      Reason for Call: the patient is still experiencing the same symptoms; you asked him to call back if he was still having the symptoms.  Please call as soon as you can    Thank you    Caller Information       Type Contact Phone    08/20/2020 07:55 AM CDT Phone (Incoming) Luis Alberto Cabello (Self) [phone] (M)          Alternative phone number: na    Turnaround time given to caller:   \"This message will be sent to [state Provider's name]. The clinical team will fulfill your request as soon as they review your message.\"    
Returned patient's phone call. Reports he is feeling dizzy in the evening and lunch time. Also endorses neck soreness. Reports muscle discomfort on the lower right rib cage. He denies LOC, chest pain or shortness of breath.    Drinking plenty of fluids during the day.     Denies nausea, vomiting, shortness of breath, abdominal pain, diarrhea, runny nose or sore throat.    States he is coughing at night. Endorses a light headache. Reports fatigue.    States he can taste and smell everything now.    BP:  140/95.     Due to symptoms, recommend self quartine for an additional week. Advised to follow up in 1 week to check on symptoms. Patient requesting to be off work for another week so he can rest adequately. Note sent to portal.   Ladonna Coreas MD  Family Medicine        
needs one more dose

## 2020-12-16 DIAGNOSIS — Z20.828 CONTACT WITH AND (SUSPECTED) EXPOSURE TO OTHER VIRAL COMMUNICABLE DISEASES: ICD-10-CM

## 2020-12-21 LAB — SARS-COV-2 N GENE NPH QL NAA+PROBE: NOT DETECTED

## 2020-12-22 ENCOUNTER — APPOINTMENT (OUTPATIENT)
Dept: INTERNAL MEDICINE | Facility: CLINIC | Age: 70
End: 2020-12-22
Payer: MEDICARE

## 2020-12-22 VITALS
OXYGEN SATURATION: 97 % | RESPIRATION RATE: 16 BRPM | TEMPERATURE: 98.4 F | SYSTOLIC BLOOD PRESSURE: 128 MMHG | BODY MASS INDEX: 32.48 KG/M2 | HEART RATE: 65 BPM | WEIGHT: 232 LBS | HEIGHT: 71 IN | DIASTOLIC BLOOD PRESSURE: 80 MMHG

## 2020-12-22 PROCEDURE — 99214 OFFICE O/P EST MOD 30 MIN: CPT | Mod: 25

## 2020-12-22 PROCEDURE — ZZZZZ: CPT

## 2020-12-22 PROCEDURE — 94729 DIFFUSING CAPACITY: CPT

## 2020-12-22 PROCEDURE — 94727 GAS DIL/WSHOT DETER LNG VOL: CPT

## 2020-12-22 PROCEDURE — 94010 BREATHING CAPACITY TEST: CPT

## 2020-12-22 RX ORDER — CYCLOBENZAPRINE HYDROCHLORIDE 10 MG/1
10 TABLET, FILM COATED ORAL 3 TIMES DAILY
Qty: 50 | Refills: 1 | Status: ACTIVE | COMMUNITY
Start: 2020-12-22

## 2020-12-22 NOTE — HISTORY OF PRESENT ILLNESS
[de-identified] : The patient comes in today for a followup evaluation and reassessment.\par \par At this time, the patient states that he is doing relatively well. She did have an issue, with his cardiologist several months ago. He states that he underwent routine testing including an echocardiogram and a coronary CT angiogram. A coronary CTA, revealed a 70% obstruction in one of the arteries. He subsequent underwent cardiac catheterization at [facility], which was negative. He is asymptomatic.\par \par The patient still has issues with chronic low back pain. He underwent I nerve ablation procedure several weeks ago. He states that he does feel better with less discomfort and symptoms. He does, however, still has significant arthritis in his spine.\par \par The patient remains active. He has been walking on a regular basis. He denies any cough or wheeze. He still did not undergo his colonoscopy due to the corona virus epidemic. He denies any acute constitutional symptoms at present. He now comes in for this assessment.

## 2020-12-22 NOTE — PLAN
[FreeTextEntry1] : #1. Continue his medication as outlined above.\par \par 2. The patient still needs to undergo colonoscopy.\par \par 3. Continue with current exercise regimen. Diet and weight loss are the most importance.\par \par 4. Followup in 6 months with a wellness evaluation and yearly routine fasting blood work.

## 2020-12-22 NOTE — DATA REVIEWED
[FreeTextEntry1] : A pulmonary function test is performed. Lung volumes reveal a normal total lung capacity and vital capacity. The RV and FRC are mildly reduced. Lung mechanics reveal a mild to moderate decrease in flow rates. Bronchodilator reactivity is not assessed. The DLCO saturation maintained. This represents a mild degree of restriction. The restriction is most likely due to his centripetal obesity. An obstructive process is noted as well.

## 2021-05-13 ENCOUNTER — APPOINTMENT (OUTPATIENT)
Dept: INTERNAL MEDICINE | Facility: CLINIC | Age: 71
End: 2021-05-13
Payer: MEDICARE

## 2021-05-13 VITALS
SYSTOLIC BLOOD PRESSURE: 124 MMHG | BODY MASS INDEX: 32.2 KG/M2 | HEART RATE: 73 BPM | TEMPERATURE: 97.8 F | RESPIRATION RATE: 18 BRPM | WEIGHT: 230 LBS | OXYGEN SATURATION: 96 % | DIASTOLIC BLOOD PRESSURE: 72 MMHG | HEIGHT: 71 IN

## 2021-05-13 DIAGNOSIS — B36.9 SUPERFICIAL MYCOSIS, UNSPECIFIED: ICD-10-CM

## 2021-05-13 PROCEDURE — 99213 OFFICE O/P EST LOW 20 MIN: CPT

## 2021-05-13 NOTE — HISTORY OF PRESENT ILLNESS
[FreeTextEntry8] : NOticed rash to L calf 2 weeks ago. Started as a 1 cm circular patch.    +itching and has been applying neosporin but area is getting bigger.  He denies fevers or any pain.  Denies recent tick bite.  Recent visit to spine sx who advised visit here.

## 2021-05-13 NOTE — PHYSICAL EXAM
[Normal] : affect was normal and insight and judgment were intact [de-identified] : Half dollar sized scaly patch to L calf.  Non-tender. No evidence of cellulitis.

## 2021-05-18 ENCOUNTER — APPOINTMENT (OUTPATIENT)
Dept: DERMATOLOGY | Facility: CLINIC | Age: 71
End: 2021-05-18

## 2021-05-24 PROBLEM — R21 RASH: Status: ACTIVE | Noted: 2021-05-24

## 2021-05-25 ENCOUNTER — APPOINTMENT (OUTPATIENT)
Dept: DERMATOLOGY | Facility: CLINIC | Age: 71
End: 2021-05-25
Payer: MEDICARE

## 2021-05-25 ENCOUNTER — NON-APPOINTMENT (OUTPATIENT)
Age: 71
End: 2021-05-25

## 2021-05-25 DIAGNOSIS — R21 RASH AND OTHER NONSPECIFIC SKIN ERUPTION: ICD-10-CM

## 2021-05-25 PROCEDURE — 99203 OFFICE O/P NEW LOW 30 MIN: CPT

## 2021-05-25 NOTE — ASSESSMENT
[FreeTextEntry1] : nummular derm\par stop clotrimazole\par -education\par -gentle skin care reviewed\par betamethasone ointment BID PRN itch; SED

## 2021-05-25 NOTE — PHYSICAL EXAM
[FreeTextEntry3] : AAOx3, pleasant, NAD, no visual lymphadenopathy\par hair, scalp, face, nose, eyelids, ears, lips, oropharynx, neck, chest, abdomen, back, right arm, left arm, nails, and hands examined with all normal findings,\par pertinent findings include:\par \par xerosis and nummular patch on left lower leg

## 2021-05-25 NOTE — HISTORY OF PRESENT ILLNESS
[FreeTextEntry1] : spot on left calf [de-identified] : 71 year old male with spot on left calf. itchy. was given clotrimazole.

## 2021-06-15 ENCOUNTER — LABORATORY RESULT (OUTPATIENT)
Age: 71
End: 2021-06-15

## 2021-06-17 LAB
ALBUMIN SERPL ELPH-MCNC: 4 G/DL
ALP BLD-CCNC: 61 U/L
ALT SERPL-CCNC: 23 U/L
ANION GAP SERPL CALC-SCNC: 10 MMOL/L
APPEARANCE: CLEAR
AST SERPL-CCNC: 22 U/L
BACTERIA: NEGATIVE
BASOPHILS # BLD AUTO: 0.04 K/UL
BASOPHILS NFR BLD AUTO: 0.6 %
BILIRUB SERPL-MCNC: 0.3 MG/DL
BILIRUBIN URINE: NEGATIVE
BLOOD URINE: NEGATIVE
BUN SERPL-MCNC: 22 MG/DL
CALCIUM SERPL-MCNC: 8.8 MG/DL
CHLORIDE SERPL-SCNC: 105 MMOL/L
CHOLEST SERPL-MCNC: 116 MG/DL
CO2 SERPL-SCNC: 25 MMOL/L
COLOR: ABNORMAL
CREAT SERPL-MCNC: 1.03 MG/DL
EOSINOPHIL # BLD AUTO: 0.22 K/UL
EOSINOPHIL NFR BLD AUTO: 3.3 %
ESTIMATED AVERAGE GLUCOSE: 123 MG/DL
GLUCOSE QUALITATIVE U: NEGATIVE
GLUCOSE SERPL-MCNC: 87 MG/DL
HBA1C MFR BLD HPLC: 5.9 %
HCT VFR BLD CALC: 43.5 %
HDLC SERPL-MCNC: 39 MG/DL
HGB BLD-MCNC: 14 G/DL
HYALINE CASTS: 2 /LPF
IMM GRANULOCYTES NFR BLD AUTO: 0.3 %
KETONES URINE: NORMAL
LDLC SERPL CALC-MCNC: 65 MG/DL
LEUKOCYTE ESTERASE URINE: NEGATIVE
LYMPHOCYTES # BLD AUTO: 3.05 K/UL
LYMPHOCYTES NFR BLD AUTO: 45.3 %
MAN DIFF?: NORMAL
MCHC RBC-ENTMCNC: 30.7 PG
MCHC RBC-ENTMCNC: 32.2 GM/DL
MCV RBC AUTO: 95.4 FL
MICROSCOPIC-UA: NORMAL
MONOCYTES # BLD AUTO: 0.61 K/UL
MONOCYTES NFR BLD AUTO: 9.1 %
NEUTROPHILS # BLD AUTO: 2.79 K/UL
NEUTROPHILS NFR BLD AUTO: 41.4 %
NITRITE URINE: NEGATIVE
NONHDLC SERPL-MCNC: 77 MG/DL
PH URINE: 6
PLATELET # BLD AUTO: 267 K/UL
POTASSIUM SERPL-SCNC: 5 MMOL/L
PROT SERPL-MCNC: 6.2 G/DL
PROTEIN URINE: ABNORMAL
PSA SERPL-MCNC: <0.01 NG/ML
RBC # BLD: 4.56 M/UL
RBC # FLD: 13.1 %
RED BLOOD CELLS URINE: 2 /HPF
SODIUM SERPL-SCNC: 140 MMOL/L
SPECIFIC GRAVITY URINE: 1.04
SQUAMOUS EPITHELIAL CELLS: 1 /HPF
T3FREE SERPL-MCNC: 3.17 PG/ML
T3RU NFR SERPL: 1.1 TBI
T4 FREE SERPL-MCNC: 0.9 NG/DL
T4 SERPL-MCNC: 5.6 UG/DL
TRIGL SERPL-MCNC: 64 MG/DL
TSH SERPL-ACNC: 5.54 UIU/ML
UROBILINOGEN URINE: ABNORMAL
WBC # FLD AUTO: 6.73 K/UL
WHITE BLOOD CELLS URINE: 2 /HPF

## 2021-06-25 ENCOUNTER — APPOINTMENT (OUTPATIENT)
Dept: INTERNAL MEDICINE | Facility: CLINIC | Age: 71
End: 2021-06-25
Payer: MEDICARE

## 2021-06-25 VITALS
HEIGHT: 71 IN | WEIGHT: 228 LBS | RESPIRATION RATE: 16 BRPM | SYSTOLIC BLOOD PRESSURE: 126 MMHG | DIASTOLIC BLOOD PRESSURE: 84 MMHG | BODY MASS INDEX: 31.92 KG/M2 | OXYGEN SATURATION: 98 % | TEMPERATURE: 97.9 F | HEART RATE: 61 BPM

## 2021-06-25 PROCEDURE — 90715 TDAP VACCINE 7 YRS/> IM: CPT | Mod: GY

## 2021-06-25 PROCEDURE — 90471 IMMUNIZATION ADMIN: CPT

## 2021-06-25 PROCEDURE — G0439: CPT

## 2021-06-25 NOTE — PLAN
[FreeTextEntry1] : 1. Continue his medication as outlined above.\par \par 2. Tetanus has been updated today.\par \par 3. The patient still needs to undergo followup colonoscopy as it has still not yet been performed\par \par 4. Follow up in 6 months with full pulmonary function testing and EKG.\par \par 5. The patient is to followup with his urologist due to his history of prostate cancer.

## 2021-06-25 NOTE — HISTORY OF PRESENT ILLNESS
[FreeTextEntry1] : The patient comes in for a yearly wellness evaluation\par  [de-identified] : The patient states, that overall, he is doing relatively well. He still has issues with his chronic low back pain. He continues to see an orthopedic spine specialist. He recently underwent 3 facet blocks yesterday. He is feeling somewhat sore today, but overall he is improved.\par \par The patient underwent a cardiac evaluation several months ago. He was felt to have an obstructive lesion that was seen on the coronary CT angiogram. He subsequently underwent a cardiac catheterization earlier this year that did not reveal any obstructive lesions. He was told that he needs to lose weight angle and a diet. He has lost 10 pounds to date.\par \par The patient was seen by his dermatologist last month. It was for a routine evaluation. He denies any change in bowel habits. He has received 2 doses of the corona virus vaccination. His breathing is good. He now comes in for this assessment.

## 2021-06-25 NOTE — PHYSICAL EXAM
[Normal Affect] : the affect was normal [Normal Insight/Judgement] : insight and judgment were intact [General Appearance - Alert] : alert [General Appearance - In No Acute Distress] : in no acute distress [Sclera] : the sclera and conjunctiva were normal [PERRL With Normal Accommodation] : pupils were equal in size, round, and reactive to light [Extraocular Movements] : extraocular movements were intact [Neck Appearance] : the appearance of the neck was normal [Neck Cervical Mass (___cm)] : no neck mass was observed [Jugular Venous Distention Increased] : there was no jugular-venous distention [Thyroid Nodule] : there were no palpable thyroid nodules [Thyroid Diffuse Enlargement] : the thyroid was not enlarged [Auscultation Breath Sounds / Voice Sounds] : lungs were clear to auscultation bilaterally [Heart Rate And Rhythm] : heart rate was normal and rhythm regular [Heart Sounds] : normal S1 and S2 [Heart Sounds Gallop] : no gallops [Murmurs] : no murmurs [Heart Sounds Pericardial Friction Rub] : no pericardial rub [Bowel Sounds] : normal bowel sounds [Abdomen Soft] : soft [Abdomen Tenderness] : non-tender [] : no hepato-splenomegaly [Abdomen Mass (___ Cm)] : no abdominal mass palpated [Cervical Lymph Nodes Enlarged Posterior Bilaterally] : posterior cervical [Cervical Lymph Nodes Enlarged Anterior Bilaterally] : anterior cervical [Supraclavicular Lymph Nodes Enlarged Bilaterally] : supraclavicular [No CVA Tenderness] : no ~M costovertebral angle tenderness [Nail Clubbing] : no clubbing  or cyanosis of the fingernails [FreeTextEntry1] : Evidence of sun damage. seborrheic keratoses are present

## 2022-01-04 ENCOUNTER — APPOINTMENT (OUTPATIENT)
Dept: INTERNAL MEDICINE | Facility: CLINIC | Age: 72
End: 2022-01-04
Payer: MEDICARE

## 2022-01-04 ENCOUNTER — APPOINTMENT (OUTPATIENT)
Dept: INTERNAL MEDICINE | Facility: CLINIC | Age: 72
End: 2022-01-04

## 2022-01-04 ENCOUNTER — TRANSCRIPTION ENCOUNTER (OUTPATIENT)
Age: 72
End: 2022-01-04

## 2022-01-04 DIAGNOSIS — R05.9 COUGH, UNSPECIFIED: ICD-10-CM

## 2022-01-04 PROCEDURE — 99443: CPT | Mod: CS,95

## 2022-01-04 NOTE — HISTORY OF PRESENT ILLNESS
[Home] : at home, [unfilled] , at the time of the visit. [Medical Office: ([facility])___] : at the medical office located in  [Verbal consent obtained from patient] : the patient, [unfilled] [FreeTextEntry8] : This is a 71-year-old male with a 4-day history of cough which seems to be improving, aches, slight sore throat.  He tested positive for Covid at home on 12/31/2021.  This was the [facility] home test.  Did not document or save this result.  He is not having fever or shortness of breath.  He is not having tiredness or loss of taste or smell.\par \par He received his third Pfizer Covid vaccination in October 2021.\par \par

## 2022-01-04 NOTE — ASSESSMENT
[FreeTextEntry1] : #1  Recent Covid infection.  Symptoms began and had positive test on 12/31/2021.  Patient will ensure fluids, take Mucinex as needed, Tylenol as needed, benzonatate 3 times daily as needed cough.  He is asking about possible monoclonal antibody treatment.  He repeated home test today, which is positive.  Will refer for monoclonal antibody treatment. \par \par Total duration of telephone visit today was 25 minutes.
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
## 2022-07-19 ENCOUNTER — EMERGENCY (EMERGENCY)
Facility: HOSPITAL | Age: 72
LOS: 0 days | Discharge: HOME | End: 2022-07-20
Attending: EMERGENCY MEDICINE
Payer: MEDICARE

## 2022-07-19 VITALS
TEMPERATURE: 98 F | HEART RATE: 77 BPM | HEIGHT: 71 IN | WEIGHT: 229.94 LBS | SYSTOLIC BLOOD PRESSURE: 127 MMHG | RESPIRATION RATE: 19 BRPM | DIASTOLIC BLOOD PRESSURE: 89 MMHG | OXYGEN SATURATION: 97 %

## 2022-07-19 DIAGNOSIS — J30.2 OTHER SEASONAL ALLERGIC RHINITIS: ICD-10-CM

## 2022-07-19 DIAGNOSIS — M54.9 DORSALGIA, UNSPECIFIED: ICD-10-CM

## 2022-07-19 DIAGNOSIS — Z20.822 CONTACT WITH AND (SUSPECTED) EXPOSURE TO COVID-19: ICD-10-CM

## 2022-07-19 DIAGNOSIS — Z98.890 OTHER SPECIFIED POSTPROCEDURAL STATES: Chronic | ICD-10-CM

## 2022-07-19 DIAGNOSIS — Z90.79 ACQUIRED ABSENCE OF OTHER GENITAL ORGAN(S): Chronic | ICD-10-CM

## 2022-07-19 DIAGNOSIS — G89.29 OTHER CHRONIC PAIN: ICD-10-CM

## 2022-07-19 DIAGNOSIS — M47.27 OTHER SPONDYLOSIS WITH RADICULOPATHY, LUMBOSACRAL REGION: ICD-10-CM

## 2022-07-19 DIAGNOSIS — C61 MALIGNANT NEOPLASM OF PROSTATE: ICD-10-CM

## 2022-07-19 DIAGNOSIS — Z91.018 ALLERGY TO OTHER FOODS: ICD-10-CM

## 2022-07-19 DIAGNOSIS — R10.9 UNSPECIFIED ABDOMINAL PAIN: ICD-10-CM

## 2022-07-19 DIAGNOSIS — R10.811 RIGHT UPPER QUADRANT ABDOMINAL TENDERNESS: ICD-10-CM

## 2022-07-19 DIAGNOSIS — M48.061 SPINAL STENOSIS, LUMBAR REGION WITHOUT NEUROGENIC CLAUDICATION: ICD-10-CM

## 2022-07-19 DIAGNOSIS — G47.30 SLEEP APNEA, UNSPECIFIED: ICD-10-CM

## 2022-07-19 LAB
ALBUMIN SERPL ELPH-MCNC: 4 G/DL — SIGNIFICANT CHANGE UP (ref 3.3–5)
ALP SERPL-CCNC: 80 U/L — SIGNIFICANT CHANGE UP (ref 40–120)
ALT FLD-CCNC: 28 U/L — SIGNIFICANT CHANGE UP (ref 12–78)
ANION GAP SERPL CALC-SCNC: 2 MMOL/L — LOW (ref 5–17)
APPEARANCE UR: CLEAR — SIGNIFICANT CHANGE UP
AST SERPL-CCNC: 17 U/L — SIGNIFICANT CHANGE UP (ref 15–37)
BASOPHILS # BLD AUTO: 0.03 K/UL — SIGNIFICANT CHANGE UP (ref 0–0.2)
BASOPHILS NFR BLD AUTO: 0.4 % — SIGNIFICANT CHANGE UP (ref 0–2)
BILIRUB SERPL-MCNC: 0.7 MG/DL — SIGNIFICANT CHANGE UP (ref 0.2–1.2)
BILIRUB UR-MCNC: NEGATIVE — SIGNIFICANT CHANGE UP
BUN SERPL-MCNC: 14 MG/DL — SIGNIFICANT CHANGE UP (ref 7–23)
CALCIUM SERPL-MCNC: 9.4 MG/DL — SIGNIFICANT CHANGE UP (ref 8.5–10.1)
CHLORIDE SERPL-SCNC: 106 MMOL/L — SIGNIFICANT CHANGE UP (ref 96–108)
CO2 SERPL-SCNC: 29 MMOL/L — SIGNIFICANT CHANGE UP (ref 22–31)
COLOR SPEC: YELLOW — SIGNIFICANT CHANGE UP
CREAT SERPL-MCNC: 0.97 MG/DL — SIGNIFICANT CHANGE UP (ref 0.5–1.3)
DIFF PNL FLD: ABNORMAL
EGFR: 83 ML/MIN/1.73M2 — SIGNIFICANT CHANGE UP
EOSINOPHIL # BLD AUTO: 0.07 K/UL — SIGNIFICANT CHANGE UP (ref 0–0.5)
EOSINOPHIL NFR BLD AUTO: 0.9 % — SIGNIFICANT CHANGE UP (ref 0–6)
GLUCOSE SERPL-MCNC: 131 MG/DL — HIGH (ref 70–99)
GLUCOSE UR QL: NEGATIVE — SIGNIFICANT CHANGE UP
HCT VFR BLD CALC: 49 % — SIGNIFICANT CHANGE UP (ref 39–50)
HGB BLD-MCNC: 16.3 G/DL — SIGNIFICANT CHANGE UP (ref 13–17)
IMM GRANULOCYTES NFR BLD AUTO: 0.2 % — SIGNIFICANT CHANGE UP (ref 0–1.5)
KETONES UR-MCNC: NEGATIVE — SIGNIFICANT CHANGE UP
LEUKOCYTE ESTERASE UR-ACNC: NEGATIVE — SIGNIFICANT CHANGE UP
LIDOCAIN IGE QN: 52 U/L — LOW (ref 73–393)
LYMPHOCYTES # BLD AUTO: 2.34 K/UL — SIGNIFICANT CHANGE UP (ref 1–3.3)
LYMPHOCYTES # BLD AUTO: 28.6 % — SIGNIFICANT CHANGE UP (ref 13–44)
MCHC RBC-ENTMCNC: 30.3 PG — SIGNIFICANT CHANGE UP (ref 27–34)
MCHC RBC-ENTMCNC: 33.3 GM/DL — SIGNIFICANT CHANGE UP (ref 32–36)
MCV RBC AUTO: 91.1 FL — SIGNIFICANT CHANGE UP (ref 80–100)
MONOCYTES # BLD AUTO: 0.57 K/UL — SIGNIFICANT CHANGE UP (ref 0–0.9)
MONOCYTES NFR BLD AUTO: 7 % — SIGNIFICANT CHANGE UP (ref 2–14)
NEUTROPHILS # BLD AUTO: 5.16 K/UL — SIGNIFICANT CHANGE UP (ref 1.8–7.4)
NEUTROPHILS NFR BLD AUTO: 62.9 % — SIGNIFICANT CHANGE UP (ref 43–77)
NITRITE UR-MCNC: NEGATIVE — SIGNIFICANT CHANGE UP
PH UR: 5 — SIGNIFICANT CHANGE UP (ref 5–8)
PLATELET # BLD AUTO: 279 K/UL — SIGNIFICANT CHANGE UP (ref 150–400)
POTASSIUM SERPL-MCNC: 4 MMOL/L — SIGNIFICANT CHANGE UP (ref 3.5–5.3)
POTASSIUM SERPL-SCNC: 4 MMOL/L — SIGNIFICANT CHANGE UP (ref 3.5–5.3)
PROT SERPL-MCNC: 7.8 GM/DL — SIGNIFICANT CHANGE UP (ref 6–8.3)
PROT UR-MCNC: NEGATIVE — SIGNIFICANT CHANGE UP
RBC # BLD: 5.38 M/UL — SIGNIFICANT CHANGE UP (ref 4.2–5.8)
RBC # FLD: 13 % — SIGNIFICANT CHANGE UP (ref 10.3–14.5)
SODIUM SERPL-SCNC: 137 MMOL/L — SIGNIFICANT CHANGE UP (ref 135–145)
SP GR SPEC: 1.01 — SIGNIFICANT CHANGE UP (ref 1.01–1.02)
UROBILINOGEN FLD QL: NEGATIVE — SIGNIFICANT CHANGE UP
WBC # BLD: 8.19 K/UL — SIGNIFICANT CHANGE UP (ref 3.8–10.5)
WBC # FLD AUTO: 8.19 K/UL — SIGNIFICANT CHANGE UP (ref 3.8–10.5)

## 2022-07-19 PROCEDURE — 74176 CT ABD & PELVIS W/O CONTRAST: CPT | Mod: 26,MA

## 2022-07-19 PROCEDURE — 0225U NFCT DS DNA&RNA 21 SARSCOV2: CPT

## 2022-07-19 PROCEDURE — 83690 ASSAY OF LIPASE: CPT

## 2022-07-19 PROCEDURE — 76705 ECHO EXAM OF ABDOMEN: CPT

## 2022-07-19 PROCEDURE — 96375 TX/PRO/DX INJ NEW DRUG ADDON: CPT

## 2022-07-19 PROCEDURE — 81001 URINALYSIS AUTO W/SCOPE: CPT

## 2022-07-19 PROCEDURE — 36415 COLL VENOUS BLD VENIPUNCTURE: CPT

## 2022-07-19 PROCEDURE — 76705 ECHO EXAM OF ABDOMEN: CPT | Mod: 26

## 2022-07-19 PROCEDURE — 85025 COMPLETE CBC W/AUTO DIFF WBC: CPT

## 2022-07-19 PROCEDURE — 93010 ELECTROCARDIOGRAM REPORT: CPT

## 2022-07-19 PROCEDURE — 96374 THER/PROPH/DIAG INJ IV PUSH: CPT

## 2022-07-19 PROCEDURE — 80053 COMPREHEN METABOLIC PANEL: CPT

## 2022-07-19 PROCEDURE — 99285 EMERGENCY DEPT VISIT HI MDM: CPT | Mod: FS

## 2022-07-19 PROCEDURE — 93005 ELECTROCARDIOGRAM TRACING: CPT

## 2022-07-19 PROCEDURE — 96376 TX/PRO/DX INJ SAME DRUG ADON: CPT

## 2022-07-19 PROCEDURE — 99285 EMERGENCY DEPT VISIT HI MDM: CPT | Mod: 25

## 2022-07-19 PROCEDURE — 74176 CT ABD & PELVIS W/O CONTRAST: CPT | Mod: MA

## 2022-07-19 RX ORDER — ONDANSETRON 8 MG/1
4 TABLET, FILM COATED ORAL ONCE
Refills: 0 | Status: COMPLETED | OUTPATIENT
Start: 2022-07-19 | End: 2022-07-19

## 2022-07-19 RX ORDER — MORPHINE SULFATE 50 MG/1
4 CAPSULE, EXTENDED RELEASE ORAL ONCE
Refills: 0 | Status: DISCONTINUED | OUTPATIENT
Start: 2022-07-19 | End: 2022-07-19

## 2022-07-19 RX ORDER — KETOROLAC TROMETHAMINE 30 MG/ML
30 SYRINGE (ML) INJECTION ONCE
Refills: 0 | Status: DISCONTINUED | OUTPATIENT
Start: 2022-07-19 | End: 2022-07-19

## 2022-07-19 RX ORDER — SODIUM CHLORIDE 9 MG/ML
1000 INJECTION INTRAMUSCULAR; INTRAVENOUS; SUBCUTANEOUS ONCE
Refills: 0 | Status: COMPLETED | OUTPATIENT
Start: 2022-07-19 | End: 2022-07-19

## 2022-07-19 RX ADMIN — SODIUM CHLORIDE 1000 MILLILITER(S): 9 INJECTION INTRAMUSCULAR; INTRAVENOUS; SUBCUTANEOUS at 20:57

## 2022-07-19 RX ADMIN — MORPHINE SULFATE 4 MILLIGRAM(S): 50 CAPSULE, EXTENDED RELEASE ORAL at 20:57

## 2022-07-19 RX ADMIN — ONDANSETRON 4 MILLIGRAM(S): 8 TABLET, FILM COATED ORAL at 20:57

## 2022-07-19 RX ADMIN — Medication 30 MILLIGRAM(S): at 20:57

## 2022-07-19 NOTE — ED STATDOCS - PROGRESS NOTE DETAILS
73 y/o Male with chronic back pain, kidney stones presents to ED c/o right side pain that started at 10pm last night and radiates toward groin.  Pt denies blood in urine.  Neg nausea, vomiting, fevers, chills.  On exam, CTA B. RRR. Abd: Round, Active BS x4, Soft, (+) R emery-umbilical, RUQ and Right flank tenderness to palp.  Will  f/u Labs, CT.  Umm Bailey PA-C pt with Dr. Stein for surgery ROSELYN Henao DO pt evaluated by surgery,not cholecystitis, pt having continued back/flank pain will give troadol and valium, re catarina Henao DO pt beginning to have pain again, likely orthopedic in nature, no bowel or bladder incontinence, able to walk, pt wants to go home will give rx for pain meds, pt has fu with his spine doctor this week ROSELYN Henao DO

## 2022-07-19 NOTE — ED STATDOCS - NSICDXPASTMEDICALHX_GEN_ALL_CORE_FT
PAST MEDICAL HISTORY:  Prostate cancer Prostatectomy    Sciatica, unspecified laterality     Seasonal allergic rhinitis, unspecified allergic rhinitis trigger     Sleep apnea, unspecified type Oral device. No CPAP Machine    Spinal stenosis of lumbar region     Spondylosis of lumbosacral region, unspecified spinal osteoarthritis complication status     Urethral stricture, unspecified stricture type     Varicose veins left lower extremity

## 2022-07-19 NOTE — ED STATDOCS - OBJECTIVE STATEMENT
72 M hx seasonal allergic rhinitis, sciatica, spina stenosis of lumbar region, varicose veins, kidney stones, urethral stricture, spondylosis of lumbosacral region, sleep apnea, prostate cancer here c/o right flank pain radiating to groin that started at 10 PM last night.  no fever. no n/v. states he was sweating a lot. NKDA.

## 2022-07-19 NOTE — ED STATDOCS - NSICDXFAMILYHX_GEN_ALL_CORE_FT
FAMILY HISTORY:  Father  Still living? No  Family history of heart failure, Age at diagnosis: Age Unknown    Mother  Still living? No  Family history of cancer, Age at diagnosis: Age Unknown

## 2022-07-19 NOTE — ED ADULT NURSE NOTE - NSHOSCREENINGQ1_ED_ALL_ED
Patient called to schedule a NP appt  He wanted next Thursday and I advised the first available was in oct   He declined to make an appt
No

## 2022-07-19 NOTE — ED STATDOCS - NSFOLLOWUPINSTRUCTIONS_ED_ALL_ED_FT
Acute Back Pain, Adult      Acute back pain is sudden and usually short-lived. It is often caused by an injury to the muscles and tissues in the back. The injury may result from:  •A muscle, tendon, or ligament getting overstretched or torn. Ligaments are tissues that connect bones to each other. Lifting something improperly can cause a back strain.      •Wear and tear (degeneration) of the spinal disks. Spinal disks are circular tissue that provide cushioning between the bones of the spine (vertebrae).      •Twisting motions, such as while playing sports or doing yard work.      •A hit to the back.      •Arthritis.      You may have a physical exam, lab tests, and imaging tests to find the cause of your pain. Acute back pain usually goes away with rest and home care.      Follow these instructions at home:    Managing pain, stiffness, and swelling     •Take over-the-counter and prescription medicines only as told by your health care provider. Treatment may include medicines for pain and inflammation that are taken by mouth or applied to the skin, or muscle relaxants.    •Your health care provider may recommend applying ice during the first 24–48 hours after your pain starts. To do this:  •Put ice in a plastic bag.      •Place a towel between your skin and the bag.      •Leave the ice on for 20 minutes, 2–3 times a day.      •Remove the ice if your skin turns bright red. This is very important. If you cannot feel pain, heat, or cold, you have a greater risk of damage to the area.      •If directed, apply heat to the affected area as often as told by your health care provider. Use the heat source that your health care provider recommends, such as a moist heat pack or a heating pad.  •Place a towel between your skin and the heat source.      •Leave the heat on for 20–30 minutes.      •Remove the heat if your skin turns bright red. This is especially important if you are unable to feel pain, heat, or cold. You have a greater risk of getting burned.          Activity   Comparisons of good and bad posture while driving, standing, sitting at a desk, and lifting heavy objects.   • Do not stay in bed. Staying in bed for more than 1–2 days can delay your recovery.    •Sit up and stand up straight. Avoid leaning forward when you sit or hunching over when you stand.  •If you work at a desk, sit close to it so you do not need to lean over. Keep your chin tucked in. Keep your neck drawn back, and keep your elbows bent at a 90-degree angle (right angle).      •Sit high and close to the steering wheel when you drive. Add lower back (lumbar) support to your car seat, if needed.        •Take short walks on even surfaces as soon as you are able. Try to increase the length of time you walk each day.      • Do not sit, drive, or  one place for more than 30 minutes at a time. Sitting or standing for long periods of time can put stress on your back.      • Do not drive or use heavy machinery while taking prescription pain medicine.    •Use proper lifting techniques. When you bend and lift, use positions that put less stress on your back:  •Bend your knees.      •Keep the load close to your body.      •Avoid twisting.        •Exercise regularly as told by your health care provider. Exercising helps your back heal faster and helps prevent back injuries by keeping muscles strong and flexible.      •Work with a physical therapist to make a safe exercise program, as recommended by your health care provider. Do any exercises as told by your physical therapist.      Lifestyle     •Maintain a healthy weight. Extra weight puts stress on your back and makes it difficult to have good posture.      •Avoid activities or situations that make you feel anxious or stressed. Stress and anxiety increase muscle tension and can make back pain worse. Learn ways to manage anxiety and stress, such as through exercise.      General instructions     •Sleep on a firm mattress in a comfortable position. Try lying on your side with your knees slightly bent. If you lie on your back, put a pillow under your knees.    •Keep your head and neck in a straight line with your spine (neutral position) when using electronic equipment like smartphones or pads. To do this:  •Raise your smartphone or pad to look at it instead of bending your head or neck to look down.      •Put the smartphone or pad at the level of your face while looking at the screen.      •Follow your treatment plan as told by your health care provider. This may include:  •Cognitive or behavioral therapy.      •Acupuncture or massage therapy.      •Meditation or yoga.          Contact a health care provider if:    •You have pain that is not relieved with rest or medicine.      •You have increasing pain going down into your legs or buttocks.      •Your pain does not improve after 2 weeks.      •You have pain at night.      •You lose weight without trying.      •You have a fever or chills.      •You develop nausea or vomiting.      •You develop abdominal pain.        Get help right away if:    •You develop new bowel or bladder control problems.      •You have unusual weakness or numbness in your arms or legs.      •You feel faint.      These symptoms may represent a serious problem that is an emergency. Do not wait to see if the symptoms will go away. Get medical help right away. Call your local emergency services (911 in the [place] Do not drive yourself to the hospital.       Summary    •Acute back pain is sudden and usually short-lived.      •Use proper lifting techniques. When you bend and lift, use positions that put less stress on your back.      •Take over-the-counter and prescription medicines only as told by your health care provider, and apply heat or ice as told.      This information is not intended to replace advice given to you by your health care provider. Make sure you discuss any questions you have with your health care provider.      Document Revised: 03/11/2022 Document Reviewed: 03/11/2022    Elsevier Patient Education © 2022 [employer]

## 2022-07-19 NOTE — ED ADULT NURSE NOTE - OBJECTIVE STATEMENT
Pt presents to the ED c/o right flank pain Pt presents to the ED c/o right flank pain radiating to the right groin. Pt presents to the ED c/o right flank pain radiating to the right groin since this am. pt endorses this pain while urinating. pt denies fevers, chest pain, SOB, hematuria.

## 2022-07-19 NOTE — ED STATDOCS - NSICDXPASTSURGICALHX_GEN_ALL_CORE_FT
PAST SURGICAL HISTORY:  H/O prostatectomy 2009    H/O umbilical hernia repair 2011    History of lumbar laminectomy 2002

## 2022-07-19 NOTE — ED STATDOCS - PATIENT PORTAL LINK FT
You can access the FollowMyHealth Patient Portal offered by [facility] by registering at the following website: [email or web address]. By joining [email or web address] FollowMyHealth portal, you will also be able to view your health information using other applications (apps) compatible with our system.

## 2022-07-20 VITALS
HEART RATE: 53 BPM | OXYGEN SATURATION: 98 % | DIASTOLIC BLOOD PRESSURE: 88 MMHG | TEMPERATURE: 98 F | RESPIRATION RATE: 18 BRPM | SYSTOLIC BLOOD PRESSURE: 144 MMHG

## 2022-07-20 PROCEDURE — 12345: CPT | Mod: NC

## 2022-07-20 RX ORDER — OXYCODONE AND ACETAMINOPHEN 5; 325 MG/1; MG/1
2 TABLET ORAL ONCE
Refills: 0 | Status: DISCONTINUED | OUTPATIENT
Start: 2022-07-20 | End: 2022-07-20

## 2022-07-20 RX ORDER — DIAZEPAM 5 MG
5 TABLET ORAL ONCE
Refills: 0 | Status: DISCONTINUED | OUTPATIENT
Start: 2022-07-20 | End: 2022-07-20

## 2022-07-20 RX ORDER — KETOROLAC TROMETHAMINE 30 MG/ML
30 SYRINGE (ML) INJECTION ONCE
Refills: 0 | Status: DISCONTINUED | OUTPATIENT
Start: 2022-07-20 | End: 2022-07-20

## 2022-07-20 RX ORDER — PIPERACILLIN AND TAZOBACTAM 4; .5 G/20ML; G/20ML
3.38 INJECTION, POWDER, LYOPHILIZED, FOR SOLUTION INTRAVENOUS ONCE
Refills: 0 | Status: COMPLETED | OUTPATIENT
Start: 2022-07-20 | End: 2022-07-20

## 2022-07-20 RX ORDER — DIAZEPAM 5 MG
1 TABLET ORAL
Qty: 20 | Refills: 0
Start: 2022-07-20 | End: 2022-07-24

## 2022-07-20 RX ORDER — MORPHINE SULFATE 50 MG/1
4 CAPSULE, EXTENDED RELEASE ORAL ONCE
Refills: 0 | Status: DISCONTINUED | OUTPATIENT
Start: 2022-07-20 | End: 2022-07-20

## 2022-07-20 RX ADMIN — PIPERACILLIN AND TAZOBACTAM 200 GRAM(S): 4; .5 INJECTION, POWDER, LYOPHILIZED, FOR SOLUTION INTRAVENOUS at 00:47

## 2022-07-20 RX ADMIN — MORPHINE SULFATE 4 MILLIGRAM(S): 50 CAPSULE, EXTENDED RELEASE ORAL at 00:10

## 2022-07-20 RX ADMIN — Medication 5 MILLIGRAM(S): at 02:13

## 2022-07-20 RX ADMIN — OXYCODONE AND ACETAMINOPHEN 2 TABLET(S): 5; 325 TABLET ORAL at 03:29

## 2022-07-20 RX ADMIN — Medication 5 MILLIGRAM(S): at 03:29

## 2022-07-20 RX ADMIN — MORPHINE SULFATE 4 MILLIGRAM(S): 50 CAPSULE, EXTENDED RELEASE ORAL at 00:44

## 2022-07-20 RX ADMIN — Medication 30 MILLIGRAM(S): at 02:13

## 2022-07-20 NOTE — CONSULT NOTE ADULT - SUBJECTIVE AND OBJECTIVE BOX
CC; R Flank Pain    HPI: Pt is a 72M with a PMH of seasonal allergies, Chronic Back pain, Sciatica, Lumbar Spinal Stenosis , LS Spondylosis, sleep apnea, Prostate Ca S/P Lumbar Laminectomy , Kidney Stones, Urethral Stricture presenting with a CC of R lower Back/Flank pain Radiating to R groin x 1 day. The Pain is aggravated by movement /getting up from a seated position, leaning back. Relieved by lying flat in bed. Pain is not assocoated with meals, no fever/chills, diarrhea, Nausea, vomiting, Abdominal pain, CP. + sweating episodes associated with the pain. Initially the pain was increased with urination. No gross hematuria .      PAST MEDICAL & SURGICAL HISTORY:  Prostate cancer  Prostatectomy      Sleep apnea, unspecified type  Oral device. No CPAP Machine      Spondylosis of lumbosacral region, unspecified spinal osteoarthritis complication status      Varicose veins  left lower extremity      Urethral stricture, unspecified stricture type      Spinal stenosis of lumbar region      Sciatica, unspecified laterality      Seasonal allergic rhinitis, unspecified allergic rhinitis trigger      H/O umbilical hernia repair        H/O prostatectomy        History of lumbar laminectomy    Home Medications:  magnesium hydroxide 8% oral suspension: 30 milliliter(s) orally every 12 hours, As needed, Constipation (2017 10:36)  multivitamin: 1  orally once a day (2017 09:58)  senna oral tablet: 2 tab(s) orally once a day (at bedtime) (2017 10:36)  Vicodin HP 10 mg-660 mg oral tablet: 1  orally every 6 hours, As Needed (2017 09:58)  Vitamin C 1000 mg oral tablet, chewable:  orally  (2017 09:58)        MEDICATIONS  (STANDING):  diazepam    Tablet 5 milliGRAM(s) Oral Once  ketorolac   Injectable 30 milliGRAM(s) IV Push Once    MEDICATIONS  (PRN):      OBJECTIVE:  Vital Signs Last 24 Hrs  T(C): 36.7 (2022 19:48), Max: 36.7 (2022 19:48)  T(F): 98.1 (2022 19:48), Max: 98.1 (2022 19:48)  HR: 77 (2022 19:48) (77 - 77)  BP: 127/89 (2022 19:48) (127/89 - 127/89)  BP(mean): 101 (2022 19:48) (101 - 101)  RR: 19 (2022 19:48) (19 - 19)  SpO2: 97% (2022 19:48) (97% - 97%)    Parameters below as of 2022 19:48  Patient On (Oxygen Delivery Method): room air      PHYSICAL EXAM:      Constitutional: NAD    Eyes: PERRL, EOMI, sclera anicteric     Back: + tenderness to palpation of Right lower lumbar area/para spinal muscles to R Flank. No CVA tenderness    Respiratory: CTA B/L    Cardiovascular: S1S2 RRR    Abdomen: Non distended, + well healed Midline scar ,+ BS, soft,  + ventral hernia easily reducible without tenderness, induration or erythema.  Non tender to plapation    Extremities:+ vericose veins, no calf tenderness or edema,Skin warm/dry . + peripheral pulse B/LLE    Neurological: AAO x 3    Skin: warm, dry, no rashes    Lymph Nodes: No Lymphadenopathy    Musculoskeletal:+ tenderness to palpation of  R Lower/LS spine                               16.3   8.19  )-----------( 279      ( 2022 20:46 )             49.0     07-19    137  |  106  |  14  ----------------------------<  131<H>  4.0   |  29  |  0.97    Ca    9.4      2022 20:46    TPro  7.8  /  Alb  4.0  /  TBili  0.7  /  DBili  x   /  AST  17  /  ALT  28  /  AlkPhos  80  07-19        LIVER FUNCTIONS - ( 2022 20:46 )  Alb: 4.0 g/dL / Pro: 7.8 gm/dL / ALK PHOS: 80 U/L / ALT: 28 U/L / AST: 17 U/L / GGT: x             Urinalysis Basic - ( 2022 20:46 )    Color: Yellow / Appearance: Clear / S.015 / pH: x  Gluc: x / Ketone: Negative  / Bili: Negative / Urobili: Negative   Blood: x / Protein: Negative / Nitrite: Negative   Leuk Esterase: Negative / RBC: 0-2 /HPF / WBC Negative   Sq Epi: x / Non Sq Epi: Occasional / Bacteria: Negative    Blood, Urine: Trace ( @ 20:46)    < from: US Abdomen Upper Quadrant Right (22 @ 23:27) >    ACC: [id number] EXAM:  US ABDOMEN RT UPR QUADRANT                          PROCEDURE DATE:  2022          INTERPRETATION:  CLINICAL INDICATION: RUQ pain, Gallstones on CT.  R/O   Cholecystitis..    TECHNIQUE: Targeted right upper quadrant ultrasound of the abdomen was   performed.    COMPARISON: Same day CT.    FINDINGS: This study was technically difficult due to bowel gas.    LIVER: 17.0 cm in length. Increased echogenicity, likely fatty   infiltration. Minimally coarsened echotexture. Fatty sparing near the   gallbladder fossa.  BILIARY: No intrahepatic or extrahepatic biliary dilatation. Visualized   common bile duct measuring up to 0.4 cm.  GALLBLADDER: Multiple mobile stones measuring up to 1.0 cm. Sludge. No   significant gallbladder wall thickening or pericholecystic fluid.   Negative sonographic Castaneda sign. Patient was premedicated.  PANCREAS: Not visualized due to bowel gas.  RIGHT KIDNEY: 12.3 cm in length. No hydronephrosis. Mildly increased   echogenicity, which may bedue to technique or parenchymal disease.  ADDITIONAL: No ascites.    IMPRESSION:    Sonographic findings equivocal for acute cholecystitis. If there is   clinical suspicion for acute cholecystitis, a hepatobiliary scan may be   obtained for further evaluation.    Increased echogenicity of the visualized liver, likely fatty   infiltration. Minimally coarsened echotexture, which is nonspecific and   can be seen in the setting of early cirrhosis. Recommend clinical   correlation and follow-up as indicated.    --- End of Report ---            LINDA ALEXANDRA MD; Attending Radiologist  This document has been electronically signed. 2022 12:20AM    < end of copied text >    < from: CT Abdomen and Pelvis No Cont (22 @ 21:29) >    ACC: [id number] EXAM:  CT ABDOMEN AND PELVIS                          PROCEDURE DATE:  2022          INTERPRETATION:  CLINICAL INFORMATION: Right flank pain.    COMPARISON: 2018    CONTRAST/COMPLICATIONS:  IV Contrast: NONE  Oral Contrast: NONE  Complications: None reported at time of study completion    PROCEDURE:  CT of the Abdomen and Pelvis was performed in the prone position.  Sagittal and coronal reformats were performed.    FINDINGS:  Evaluation of solid organs and vascular structures is limited without   intravenous contrast.    LOWER CHEST: Mild bibasilar atelectasis. Coronary calcifications.    LIVER: Within normal limits.  BILE DUCTS: Normal caliber.  GALLBLADDER: Cholelithiasis.  SPLEEN: Within normal limits.  PANCREAS: Within normal limits.  ADRENALS: Within normal limits.  KIDNEYS/URETERS: 5.9 cm left renal cyst. No hydronephrosis or urinary   tract stone.    BLADDER: Minimally distended.  REPRODUCTIVE ORGANS: Prostatectomy.    BOWEL: No bowel obstruction. Appendixis normal.  PERITONEUM: No ascites.  VESSELS: Atherosclerotic changes.  RETROPERITONEUM/LYMPH NODES: No lymphadenopathy.  ABDOMINAL WALL: Within normal limits.  BONES: Degenerative changes. Status post laminectomy at L3-L5 and    posterior fusion at L5-S1. Disc spacer at L5-S1. Grade 1 anterolisthesis   at L5-S1, unchanged. L4 vertebral body hemangioma.    IMPRESSION:  No hydronephrosis or urinary tract stone.        --- End of Report ---            RICO MELTON MD; Attending Radiologist  This document has been electronically signed. 2022  9:47PM    < end of copied text >            Assessment/Plan:      R lower back/flank pain  No Abdominal pain  No Leukocytosis, Lans Nl other than Trace blood on UA, no Renal stones appreciated on CT/Sono  No Abdominal pain, diarrhea, N/V Fever  GS without evidence of Acute Stephanie  Pain/presentation likely related to Back R LS/lumbar or poss renal stones not noted on imaging  Pt has an Appt with His Back MD tomorrow/Friday  Above discussed with Dr Stein who reviewed all Pt Labs, CT, Sono  Pt may F/U with Dr Stein as an outpatient as discussed with  Pt

## 2023-01-12 ENCOUNTER — OFFICE (OUTPATIENT)
Dept: URBAN - METROPOLITAN AREA CLINIC 102 | Facility: CLINIC | Age: 73
Setting detail: OPHTHALMOLOGY
End: 2023-01-12

## 2023-01-12 ENCOUNTER — RX ONLY (RX ONLY)
Age: 73
End: 2023-01-12

## 2023-01-12 ENCOUNTER — OFFICE (OUTPATIENT)
Dept: URBAN - METROPOLITAN AREA CLINIC 102 | Facility: CLINIC | Age: 73
Setting detail: OPHTHALMOLOGY
End: 2023-01-12
Payer: MEDICARE

## 2023-01-12 DIAGNOSIS — H25.13: ICD-10-CM

## 2023-01-12 DIAGNOSIS — H43.392: ICD-10-CM

## 2023-01-12 DIAGNOSIS — H35.3131: ICD-10-CM

## 2023-01-12 DIAGNOSIS — H43.811: ICD-10-CM

## 2023-01-12 DIAGNOSIS — H40.033: ICD-10-CM

## 2023-01-12 DIAGNOSIS — H90.3: ICD-10-CM

## 2023-01-12 PROCEDURE — 92020 GONIOSCOPY: CPT | Performed by: OPHTHALMOLOGY

## 2023-01-12 PROCEDURE — 92551 PURE TONE HEARING TEST AIR: CPT | Performed by: AUDIOLOGIST

## 2023-01-12 PROCEDURE — 92014 COMPRE OPH EXAM EST PT 1/>: CPT | Performed by: OPHTHALMOLOGY

## 2023-01-12 PROCEDURE — 92134 CPTRZ OPH DX IMG PST SGM RTA: CPT | Performed by: OPHTHALMOLOGY

## 2023-01-12 ASSESSMENT — TONOMETRY
OS_IOP_MMHG: 14
OD_IOP_MMHG: 15

## 2023-01-12 ASSESSMENT — CONFRONTATIONAL VISUAL FIELD TEST (CVF)
OD_FINDINGS: FULL
OS_FINDINGS: FULL

## 2023-01-12 ASSESSMENT — KERATOMETRY
OS_AXISANGLE_DEGREES: 108
OD_K2POWER_DIOPTERS: 43.50
OS_K1POWER_DIOPTERS: 43.50
OD_AXISANGLE_DEGREES: 100
OD_K1POWER_DIOPTERS: 43.00
OS_K2POWER_DIOPTERS: 43.75

## 2023-01-12 ASSESSMENT — AXIALLENGTH_DERIVED
OS_AL: 23.2113
OD_AL: 23.2978

## 2023-01-12 ASSESSMENT — LID POSITION - DERMATOCHALASIS
OD_DERMATOCHALASIS: RLL RUL 1+ 2+
OS_DERMATOCHALASIS: LLL LUL 1+ 2+

## 2023-01-12 ASSESSMENT — REFRACTION_AUTOREFRACTION
OS_CYLINDER: -0.75
OD_AXIS: 00
OS_AXIS: 58
OS_SPHERE: +1.25
OD_SPHERE: +1.00
OD_CYLINDER: 0.00

## 2023-01-12 ASSESSMENT — VISUAL ACUITY
OS_BCVA: 20/25-1
OD_BCVA: 20/25-2

## 2023-01-12 ASSESSMENT — SPHEQUIV_DERIVED
OS_SPHEQUIV: 0.875
OD_SPHEQUIV: 1

## 2023-03-14 LAB
25(OH)D3 SERPL-MCNC: 24.5 NG/ML
ALBUMIN SERPL ELPH-MCNC: 4.1 G/DL
ALP BLD-CCNC: 65 U/L
ALT SERPL-CCNC: 20 U/L
ANION GAP SERPL CALC-SCNC: 10 MMOL/L
APPEARANCE: CLEAR
AST SERPL-CCNC: 25 U/L
BACTERIA: NEGATIVE
BASOPHILS # BLD AUTO: 0.03 K/UL
BASOPHILS NFR BLD AUTO: 0.5 %
BILIRUB SERPL-MCNC: 0.3 MG/DL
BILIRUBIN URINE: NEGATIVE
BLOOD URINE: NEGATIVE
BUN SERPL-MCNC: 20 MG/DL
CALCIUM SERPL-MCNC: 8.5 MG/DL
CHLORIDE SERPL-SCNC: 108 MMOL/L
CHOLEST SERPL-MCNC: 128 MG/DL
CO2 SERPL-SCNC: 24 MMOL/L
COLOR: YELLOW
CREAT SERPL-MCNC: 0.96 MG/DL
EGFR: 83 ML/MIN/1.73M2
EOSINOPHIL # BLD AUTO: 0.16 K/UL
EOSINOPHIL NFR BLD AUTO: 2.5 %
ESTIMATED AVERAGE GLUCOSE: 126 MG/DL
GLUCOSE QUALITATIVE U: NEGATIVE
GLUCOSE SERPL-MCNC: 76 MG/DL
HBA1C MFR BLD HPLC: 6 %
HCT VFR BLD CALC: 42.5 %
HDLC SERPL-MCNC: 46 MG/DL
HGB BLD-MCNC: 14.1 G/DL
HYALINE CASTS: 0 /LPF
IMM GRANULOCYTES NFR BLD AUTO: 0.3 %
KETONES URINE: NEGATIVE
LDLC SERPL CALC-MCNC: 58 MG/DL
LEUKOCYTE ESTERASE URINE: NEGATIVE
LYMPHOCYTES # BLD AUTO: 2.88 K/UL
LYMPHOCYTES NFR BLD AUTO: 45.1 %
MAN DIFF?: NORMAL
MCHC RBC-ENTMCNC: 30.5 PG
MCHC RBC-ENTMCNC: 33.2 GM/DL
MCV RBC AUTO: 91.8 FL
MICROSCOPIC-UA: NORMAL
MONOCYTES # BLD AUTO: 0.53 K/UL
MONOCYTES NFR BLD AUTO: 8.3 %
NEUTROPHILS # BLD AUTO: 2.77 K/UL
NEUTROPHILS NFR BLD AUTO: 43.3 %
NITRITE URINE: NEGATIVE
NONHDLC SERPL-MCNC: 81 MG/DL
PH URINE: 6
PLATELET # BLD AUTO: 238 K/UL
POTASSIUM SERPL-SCNC: 4.5 MMOL/L
PROT SERPL-MCNC: 6.1 G/DL
PROTEIN URINE: NORMAL
PSA SERPL-MCNC: <0.01 NG/ML
RBC # BLD: 4.63 M/UL
RBC # FLD: 13.1 %
RED BLOOD CELLS URINE: 1 /HPF
SODIUM SERPL-SCNC: 142 MMOL/L
SPECIFIC GRAVITY URINE: 1.03
SQUAMOUS EPITHELIAL CELLS: 1 /HPF
T3FREE SERPL-MCNC: 2.55 PG/ML
T4 FREE SERPL-MCNC: 0.9 NG/DL
TRIGL SERPL-MCNC: 117 MG/DL
TSH SERPL-ACNC: 3.56 UIU/ML
UROBILINOGEN URINE: NORMAL
WBC # FLD AUTO: 6.39 K/UL
WHITE BLOOD CELLS URINE: 0 /HPF

## 2023-03-20 ENCOUNTER — NON-APPOINTMENT (OUTPATIENT)
Age: 73
End: 2023-03-20

## 2023-03-20 ENCOUNTER — APPOINTMENT (OUTPATIENT)
Dept: INTERNAL MEDICINE | Facility: CLINIC | Age: 73
End: 2023-03-20
Payer: MEDICARE

## 2023-03-20 VITALS
DIASTOLIC BLOOD PRESSURE: 85 MMHG | OXYGEN SATURATION: 95 % | SYSTOLIC BLOOD PRESSURE: 143 MMHG | WEIGHT: 226 LBS | RESPIRATION RATE: 16 BRPM | BODY MASS INDEX: 33.47 KG/M2 | TEMPERATURE: 98.5 F | HEART RATE: 82 BPM | HEIGHT: 69 IN

## 2023-03-20 DIAGNOSIS — U07.1 COVID-19: ICD-10-CM

## 2023-03-20 DIAGNOSIS — G47.62 SLEEP RELATED LEG CRAMPS: ICD-10-CM

## 2023-03-20 DIAGNOSIS — M48.00 SPINAL STENOSIS, SITE UNSPECIFIED: ICD-10-CM

## 2023-03-20 DIAGNOSIS — I10 ESSENTIAL (PRIMARY) HYPERTENSION: ICD-10-CM

## 2023-03-20 PROCEDURE — 94727 GAS DIL/WSHOT DETER LNG VOL: CPT

## 2023-03-20 PROCEDURE — 93000 ELECTROCARDIOGRAM COMPLETE: CPT | Mod: 59

## 2023-03-20 PROCEDURE — 99214 OFFICE O/P EST MOD 30 MIN: CPT | Mod: 25

## 2023-03-20 PROCEDURE — 94729 DIFFUSING CAPACITY: CPT

## 2023-03-20 PROCEDURE — ZZZZZ: CPT

## 2023-03-20 PROCEDURE — 94060 EVALUATION OF WHEEZING: CPT

## 2023-03-20 RX ORDER — BENZONATATE 100 MG/1
100 CAPSULE ORAL 3 TIMES DAILY
Qty: 20 | Refills: 0 | Status: DISCONTINUED | COMMUNITY
Start: 2022-01-04 | End: 2023-03-20

## 2023-03-20 RX ORDER — CLOTRIMAZOLE 10 MG/G
1 CREAM TOPICAL 3 TIMES DAILY
Qty: 1 | Refills: 0 | Status: DISCONTINUED | COMMUNITY
Start: 2021-05-13 | End: 2023-03-20

## 2023-03-20 NOTE — DATA REVIEWED
[FreeTextEntry1] : A pulmonary function test is performed.  Lung volumes reveal a mild decrease in the total lung capacity and FRC, as well as the residual volume.  The vital capacity is normal.  Lung mechanics are within normal limits.  Bronchodilator reactivity is not demonstrated.  The DLCO and saturation are maintained.  This represents a mild degree of restrictive lung disease.\par \par EKG shows sinus rhythm at a rate of 72.  APCs are noted.  There are normal intervals and axis.  There are no acute ST-T wave changes seen.\par \par Routine blood work is reviewed\par

## 2023-03-20 NOTE — ADDENDUM
[FreeTextEntry1] : I, Al Gamez, documented this note as a scribe on behalf of Dr. Augie Childers MD on 03/20/2023.

## 2023-03-20 NOTE — HISTORY OF PRESENT ILLNESS
[Spouse] : spouse [FreeTextEntry1] : Patient presents for a routine follow-up visit.  [de-identified] : Patient hasn't been in office since 2021. He has been relatively well since then. He still has lower back pain issues.  He was seen by his pain management specialist, and he underwent a radiofrequency ablation of the nerve roots.  It did not really give him much relief.  He also contemplated a surgical procedure to help alleviate the arthritis, but it was not performed.\par \par He had COVID in January of 2022. He has had both COVID injections along with one booster. He is up to date on all other vaccinations. \par \par He complains of leg cramps bilaterally at night. He has tried cyclobenzaprine, Lyrica, magnesium BID, and tonic water with quinine with no alleviation of symptoms. He also has sleep apnea which he is not treated for. \par \par He had a colonoscopy done in December of 2022 which was unremarkable, except for a few small benign-appearing polyps.\par \par The patient denies any other acute constitutional symptoms.  There has been no cough or wheeze.  He now comes in for this assessment.\par \par

## 2023-03-20 NOTE — PLAN
[FreeTextEntry1] : 1. Continue current medications as previously outlined. \par \par 2. Prescribed Quinine 324 MG for patient to take one tablet an hour before bedtime to relieve bilateral leg cramping.\par \par 3. Follow-up with Dr. Middleton in dermatology yearly for full body skin exams.\par \par 4. Follow-up with urology due to history of prostate cancer. \par \par 5. Instructed patient to take 5,000 units of vitamin D daily  due to having a low vitamin D level on his blood work\par \par 6. Follow-up in 6 months for routine yearly wellness exam.

## 2023-03-20 NOTE — PHYSICAL EXAM
[General Appearance - Alert] : alert [General Appearance - In No Acute Distress] : in no acute distress [Neck Appearance] : the appearance of the neck was normal [Neck Cervical Mass (___cm)] : no neck mass was observed [Jugular Venous Distention Increased] : there was no jugular-venous distention [Thyroid Diffuse Enlargement] : the thyroid was not enlarged [Thyroid Nodule] : there were no palpable thyroid nodules [Auscultation Breath Sounds / Voice Sounds] : lungs were clear to auscultation bilaterally [Heart Rate And Rhythm] : heart rate was normal and rhythm regular [Heart Sounds] : normal S1 and S2 [Heart Sounds Gallop] : no gallops [Murmurs] : no murmurs [Heart Sounds Pericardial Friction Rub] : no pericardial rub [Bowel Sounds] : normal bowel sounds [Abdomen Soft] : soft [Abdomen Tenderness] : non-tender [] : no hepato-splenomegaly [Abdomen Mass (___ Cm)] : no abdominal mass palpated [Cervical Lymph Nodes Enlarged Posterior Bilaterally] : posterior cervical [Cervical Lymph Nodes Enlarged Anterior Bilaterally] : anterior cervical [Supraclavicular Lymph Nodes Enlarged Bilaterally] : supraclavicular [Nail Clubbing] : no clubbing  or cyanosis of the fingernails [Edema] : there was no peripheral edema [FreeTextEntry1] : Carotids are 1-2+ bilaterally.

## 2023-05-03 NOTE — ASU PREOP CHECKLIST - WARM FLUIDS/WARM BLANKETS
"2022       RE: Franklin Muir  [address]  [place] [address]     Dear Colleague,    Thank you for referring your patient, Franklin Muir, to the [facility] [place] at [facility]. Please see a copy of my visit note below.    Outcome for 22 5:38 PM: Data uploaded on Dexcom  DANIELLE Candelaria      Start time: 953  End time: 1023      HPI:   Franklin is a 48 yo man here for follow up of type 1 diabetes, diagnosed in his 30's.  He also has been seen by Dr. Wilson for many years. He reports that he has been doing well.  He is currently taking Tresiba 9 units daily at 7 AM and Novolog 1 unit/20 grams of CHO with meals and snacks, and for pre-meal correction @ 1/2 U per 30 mg/dL above 150 during the day and over 200 at HS. 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  He has been largely avoiding hypoglycemia and finds the dexcom helpful at preventing this.  Amos really likes having a sensor and feels it helps him manage his diabetes better.  He has never worn a pump.  He does have strong symptoms alerting him to hypoglycemia.  His brother also had type 1 diabetes and  of DKA.    Glucose routinely climbs in the morning, just with coffee.  He takes a couple of units when he has the coffee.  Sometimes he drops later.     He had COVID earlier this year and was treated with paxlovid.  Mild symptoms and glucose was not out of control.     He sleeps on his stomach.  Some of the overnight lows are compression lows.      Franklin wears a Dexcom G6 sensor with overall average of 151 mg/dL (CV 29%) for the past two weeks. Recent glucose is as follows:             70g snack around midnight.     Protein bar for breakfast, \"normal\" lunch, dinner, and sizeable snack (70g) around 1am. He has noticed his glucose climbs around "
3-4 in the morning.      Runs about 3 days a week. He continues running 5-6 miles at a time.   He helps to  his son's soccer team. He has noticed more achy joints, ankles, plantar fasciitis.  One weekend day and depends on the weather.     Franklin also has a history of Hashimoto's ab positivity with normal function, has never been on levothyroxine replacement therapy.  He has no palpitations. He generally has been feeling well and has no concerns today.     ROS   GENERAL: Weight stable. No fevers, chills, night sweats.      HEENT: no dysphagia, diplopia, neck pain or tenderness, dry/scratchy eyes, URI, cough, sinus drainage, tinnitus, sinus pressure  CV: no chest pain, pressure, palpitations, skipped beats, LOC  LUNGS: no SOB, FOUNTAIN, cough, sputum production, wheezing   GI: no diarrhea, constipation, abdominal pain  EXTREMITIES: +plantar fasciitis. No rashes, ulcers, edema  NEUROLOGY: no changes in vision, tingling or numbness in hands or feet.   MSK: no muscle aches or pains, weakness  PSYCH: mood stable    Past Medical History:   Diagnosis Date     Chronic lymphocytic thyroiditis 07/20/2011     Chronic lymphocytic thyroiditis 07/20/2011     Chronic lymphocytic thyroiditis      Depressive disorder 06/01/1997    Treated for a couple of years; not currently experiencing     Diabetes 06/23/2009       Past Surgical History:   Procedure Laterality Date     HC TOOTH EXTRACTION W/FORCEP       VASECTOMY  01/2019       Family History   Problem Relation Age of Onset     Hyperlipidemia Mother      Diabetes Brother      Hypertension Paternal Grandfather      Cerebrovascular Disease Paternal Grandfather      Hypertension Maternal Grandfather      Hyperlipidemia Maternal Grandfather      Cerebrovascular Disease Maternal Grandfather      Breast Cancer Maternal Aunt      Diabetes Brother      Anxiety Disorder Brother      Coronary Artery Disease Other         Maternal great grandfather     Hypertension Maternal Grandmother      
Hyperlipidemia Maternal Grandmother      Hyperlipidemia Brother      Cerebrovascular Disease Paternal Grandmother      Asthma No family hx of      C.A.D. No family hx of      Cancer - colorectal No family hx of      Prostate Cancer No family hx of        Social History     Social History     Marital status:      Spouse name: N/A     Number of children: N/A     Years of education: N/A     Social History Main Topics     Smoking status: Never Smoker     Smokeless tobacco: Never Used     Alcohol use Yes      Comment: ~ 1 drink/day     Drug use: No     Sexual activity: Yes     Partners: Female     Birth control/ protection: Condom, Natural Family Planning      Comment: Would like to talk about vasectomy     Other Topics Concern     Parent/Sibling W/ Cabg, Mi Or Angioplasty Before 65f 55m? No     Social History Narrative   Social Hx:   .  Has a son born in 2011.   at the [employer]. Now . Physically active- running and playing with his son.    Current Outpatient Medications   Medication     atorvastatin (LIPITOR) 10 MG tablet     ACE/ARB NOT PRESCRIBED, INTENTIONAL,     acetone urine (KETOSTIX) test strip     alcohol swab prep pads     blood glucose (ACCU-CHEK FASTCLIX) lancing device     blood glucose (NO BRAND SPECIFIED) test strip     blood glucose (NO BRAND SPECIFIED) test strip     blood glucose monitoring (ACCU-CHEK FASTCLIX) lancets     blood glucose monitoring (NO BRAND SPECIFIED) meter device kit     blood glucose monitoring (NO BRAND SPECIFIED) meter device kit     Continuous Blood Gluc Sensor (DEXCOM G6 SENSOR) MISC     Continuous Blood Gluc Transmit (DEXCOM G6 TRANSMITTER) MISC     glucagon (GLUCAGON EMERGENCY) 1 MG kit     Glucagon 3 MG/DOSE POWD     insulin degludec (TRESIBA FLEXTOUCH) 100 UNIT/ML pen     insulin pen needle (PENTIPS) 31G X 5 MM miscellaneous     insulin pen needle (PENTIPS) 31G X 5 MM miscellaneous     loratadine (CLARITIN) 10 MG 
tablet     naproxen sodium 220 MG capsule     NOVOLOG PENFILL 100 UNIT/ML soln     PRECISION XTRA KETONE STRP     Sodium Fluoride 1.1 % PSTE     VITAMIN D, CHOLECALCIFEROL, PO     No current facility-administered medications for this visit.          Allergies   Allergen Reactions     Ceftin Rash     Seasonal Allergies      Ampicillin Rash     Physical Exam  GENERAL: healthy, alert and no distress  RESP: no audible wheeze, cough, or visible cyanosis.  No visible retractions or increased work of breathing.  Able to speak fully in complete sentences.  PSYCH: mentation appears normal, affect normal/bright, judgement and insight intact, normal speech and appearance well-groomed    RESULTS  Lab Results   Component Value Date    A1C 6.6 (H) 07/26/2022    A1C 6.4 (H) 06/18/2021    A1C 6.8 (H) 07/03/2020    A1C 8.4 (H) 03/13/2017    A1C 7.6 (H) 02/18/2016    A1C 7.8 (H) 07/07/2015    HEMOGLOBINA1 6.4 01/17/2022    HEMOGLOBINA1 6.9 (A) 03/10/2020    HEMOGLOBINA1 6.8 (A) 09/10/2019    HEMOGLOBINA1 6.6 (A) 06/03/2019    HEMOGLOBINA1 7.0 (A) 03/12/2019       TSH   Date Value Ref Range Status   07/26/2022 2.02 0.40 - 4.00 mU/L Final   06/18/2021 2.39 0.40 - 4.00 mU/L Final   07/03/2020 2.72 0.40 - 4.00 mU/L Final   07/03/2020 2.72 0.40 - 4.00 mU/L Final   05/24/2019 1.89 0.40 - 4.00 mU/L Final   04/18/2018 2.20 0.40 - 4.00 mU/L Final     T4 Total   Date Value Ref Range Status   09/28/2010 9.0 5.0 - 11.0 ug/dL Final     T4 Free   Date Value Ref Range Status   02/18/2016 1.06 0.76 - 1.46 ng/dL Final   07/07/2015 1.04 0.76 - 1.46 ng/dL Final   01/05/2015 1.02 0.76 - 1.46 ng/dL Final     Comment:     Effective 7/30/2014, the reference range for this assay has changed to reflect   new instrumentation/methodology.     12/03/2013 1.08 0.70 - 1.85 ng/dL Final   01/26/2011 1.10 0.70 - 1.85 ng/dL Final       ALT   Date Value Ref Range Status   05/24/2019 25 0 - 70 U/L Final   03/13/2017 24 0 - 70 U/L Final   ]    Recent Labs   Lab Test 
07/26/22  0929 06/18/21  0952 02/18/16  0837 01/05/15  0933   CHOL 106 121   < > 134   HDL 49 51   < > 47   LDL 47 63   < > 76   TRIG 52 35   < > 57   CHOLHDLRATIO  --   --   --  2.9    < > = values in this interval not displayed.       Lab Results   Component Value Date     07/26/2022     03/13/2017      Lab Results   Component Value Date    POTASSIUM 4.3 07/26/2022    POTASSIUM 3.9 06/18/2021     Lab Results   Component Value Date    CHLORIDE 105 07/26/2022    CHLORIDE 102 03/13/2017     Lab Results   Component Value Date    SHANIKA 9.1 07/26/2022    SHANIKA 9.1 03/13/2017     Lab Results   Component Value Date    CO2 29 07/26/2022    CO2 31 03/13/2017     Lab Results   Component Value Date    BUN 16 07/26/2022    BUN 15 03/13/2017     Lab Results   Component Value Date    CR 0.94 07/26/2022    CR 0.85 06/18/2021       GFR Estimate   Date Value Ref Range Status   07/26/2022 >90 >60 mL/min/1.73m2 Final     Comment:     Effective December 21, 2021 eGFRcr in adults is calculated using the 2021 CKD-EPI creatinine equation which includes age and gender (Michael et al., [place], DOI: 10.1056/CPTArc2177884)   06/18/2021 >90 >60 mL/min/[1.73_m2] Final     Comment:     Non  GFR Calc  Starting 12/18/2018, serum creatinine based estimated GFR (eGFR) will be   calculated using the Chronic Kidney Disease Epidemiology Collaboration   (CKD-EPI) equation.     07/03/2020 >90 >60 mL/min/[1.73_m2] Final     Comment:     Non  GFR Calc  Starting 12/18/2018, serum creatinine based estimated GFR (eGFR) will be   calculated using the Chronic Kidney Disease Epidemiology Collaboration   (CKD-EPI) equation.     05/24/2019 >90 >60 mL/min/[1.73_m2] Final     Comment:     Non  GFR Calc  Starting 12/18/2018, serum creatinine based estimated GFR (eGFR) will be   calculated using the Chronic Kidney Disease Epidemiology Collaboration   (CKD-EPI) equation.       GFR Estimate If Black   Date Value Ref 
"Range Status   06/18/2021 >90 >60 mL/min/[1.73_m2] Final     Comment:      GFR Calc  Starting 12/18/2018, serum creatinine based estimated GFR (eGFR) will be   calculated using the Chronic Kidney Disease Epidemiology Collaboration   (CKD-EPI) equation.     07/03/2020 >90 >60 mL/min/[1.73_m2] Final     Comment:      GFR Calc  Starting 12/18/2018, serum creatinine based estimated GFR (eGFR) will be   calculated using the Chronic Kidney Disease Epidemiology Collaboration   (CKD-EPI) equation.     05/24/2019 >90 >60 mL/min/[1.73_m2] Final     Comment:      GFR Calc  Starting 12/18/2018, serum creatinine based estimated GFR (eGFR) will be   calculated using the Chronic Kidney Disease Epidemiology Collaboration   (CKD-EPI) equation.         Lab Results   Component Value Date    MICROL <5 07/26/2022    MICROL <5 06/18/2021     No results found for: MICROALBUMIN  Lab Results   Component Value Date    CPEPT 2.3 11/19/2009    GADAB >250.0 (H) 11/19/2009    ISCAB  09/08/2009     1:16  Reference range: <1:4  (Note)  TEST INFORMATION: Islet Cell Ab, IgG  Islet cell antibodies have been associated with  \"autoimmune\" endocrine disorders and insulin-dependent  diabetes. This disorder is characterized by the presence  of antibodies in patients that may be detected years  before the onset of the clinical symptoms. To calculate  Juvenile Diabetes Foundation (JDF) units: multiply the  titer x 5 (1:8  8 x 5 = 40 JDF Units).  Performed by [facility],  [address] [place] [id number] [phone]  [email or web address], Evelia Miles MD, Lab. Director       No results found for: B12]    Most recent eye exam date: : Not Found     Eye exam in January, 2022- no retinopathy.      Assessment/Plan:     1.  Type 1 diabetes- Franklin's glucose control is excellent. A1c is 6.8%.  His variability is low with very little hypoglycemia. We have discussed the In-pen in the past, but he feels he is "
doing fine on his current treatment plan. Discussed heart healthy eating ideas and encouraged patient to increase consumption of fruits, vegetables and whole grains (high fiber diet).  No other changes today.      2.  Risk factors-     Retinopathy:  No.  Had eye exam in January, 2022.    Nephropathy:  BP historically well controlled. No microalbuminuria.  Creatinine stable.   Neuropathy: No.    Feet: OK, no ulcers. Plantar fasciitis currently.  Taking tylenol and naproxen is helpful.  A little more soreness in his feet.  No swelling in joints.  Will refer to podiatry.    Lipids:  LDL at target.  Patient taking statin  Celiac screening: negative 2018    3.  Positive TPO antibodies-TSH normal on no therapy. Normal TSH 7/2022.     4. F/U in 6 months to establish with new endocrinologist, Dr. Donahue, in 1 year with me.     28 minutes spent on the date of the encounter doing chart review, review of test results, review of continuous glucose sensor, interpretation of glucose data, patient visit and documentation, counseling/coordination of care, and discussion of follow up plan for worsening hyper and hypoglycemia.  The patient understood and is satisfied with today's visit.      Liz Conroy PA-C, MPAS   [facility]  Department of Medicine  Division of Endocrinology and Diabetes      Answers for HPI/ROS submitted by the patient on 7/29/2022  If you checked off any problems, how difficult have these problems made it for you to do your work, take care of things at home, or get along with other people?: Not difficult at all  PHQ9 TOTAL SCORE: 1  General Symptoms: No  Skin Symptoms: No  HENT Symptoms: No  EYE SYMPTOMS: No  HEART SYMPTOMS: No  LUNG SYMPTOMS: No  INTESTINAL SYMPTOMS: No  URINARY SYMPTOMS: No  REPRODUCTIVE SYMPTOMS: No  SKELETAL SYMPTOMS: Yes  BLOOD SYMPTOMS: No  NERVOUS SYSTEM SYMPTOMS: No  MENTAL HEALTH SYMPTOMS: No  Back pain: No  Muscle aches: No  Neck pain: No  Swollen joints: No  Joint 
pain: Yes  Bone pain: No  Muscle cramps: No  Muscle weakness: No  Joint stiffness: Yes  Bone fracture: No        Amos is a 50 year old who is being evaluated via a billable video visit.      How would you like to obtain your AVS? MyChart  If the video visit is dropped, the invitation should be resent by: Send to e-mail at: [email or web address]  Will anyone else be joining your video visit? No        
yes
12

## 2023-07-13 ENCOUNTER — OFFICE (OUTPATIENT)
Dept: URBAN - METROPOLITAN AREA CLINIC 102 | Facility: CLINIC | Age: 73
Setting detail: OPHTHALMOLOGY
End: 2023-07-13
Payer: MEDICARE

## 2023-07-13 DIAGNOSIS — H35.3122: ICD-10-CM

## 2023-07-13 DIAGNOSIS — H25.13: ICD-10-CM

## 2023-07-13 DIAGNOSIS — H43.392: ICD-10-CM

## 2023-07-13 DIAGNOSIS — H43.811: ICD-10-CM

## 2023-07-13 DIAGNOSIS — H40.033: ICD-10-CM

## 2023-07-13 DIAGNOSIS — H35.3111: ICD-10-CM

## 2023-07-13 PROCEDURE — 92014 COMPRE OPH EXAM EST PT 1/>: CPT | Performed by: OPHTHALMOLOGY

## 2023-07-13 PROCEDURE — 92134 CPTRZ OPH DX IMG PST SGM RTA: CPT | Performed by: OPHTHALMOLOGY

## 2023-07-13 ASSESSMENT — KERATOMETRY
OD_AXISANGLE_DEGREES: 91
OD_K2POWER_DIOPTERS: 43.50
OS_AXISANGLE_DEGREES: 109
OS_K2POWER_DIOPTERS: 43.75
METHOD_AUTO_MANUAL: AUTO
OD_K1POWER_DIOPTERS: 43.00
OS_K1POWER_DIOPTERS: 43.25

## 2023-07-13 ASSESSMENT — REFRACTION_AUTOREFRACTION
OS_CYLINDER: -0.75
OD_SPHERE: +0.25
OS_SPHERE: +1.25
OD_CYLINDER: -0.25
OD_AXIS: 154
OS_AXIS: 58

## 2023-07-13 ASSESSMENT — SPHEQUIV_DERIVED
OD_SPHEQUIV: 0.125
OS_SPHEQUIV: 0.875

## 2023-07-13 ASSESSMENT — TONOMETRY
OD_IOP_MMHG: 20
OS_IOP_MMHG: 17

## 2023-07-13 ASSESSMENT — VISUAL ACUITY
OD_BCVA: 20/25-2
OS_BCVA: 20/20

## 2023-07-13 ASSESSMENT — AXIALLENGTH_DERIVED
OS_AL: 23.2559
OD_AL: 23.6351

## 2023-07-13 ASSESSMENT — LID POSITION - DERMATOCHALASIS
OD_DERMATOCHALASIS: RLL RUL 1+ 2+
OS_DERMATOCHALASIS: LLL LUL 1+ 2+

## 2023-07-13 ASSESSMENT — CONFRONTATIONAL VISUAL FIELD TEST (CVF)
OS_FINDINGS: FULL
OD_FINDINGS: FULL

## 2023-07-14 ENCOUNTER — OFFICE (OUTPATIENT)
Dept: URBAN - METROPOLITAN AREA CLINIC 64 | Facility: CLINIC | Age: 73
Setting detail: OPHTHALMOLOGY
End: 2023-07-14

## 2023-07-14 DIAGNOSIS — H90.3: ICD-10-CM

## 2023-07-14 PROCEDURE — N/C NO CHARGE: Performed by: AUDIOLOGIST

## 2023-09-11 ENCOUNTER — TRANSCRIPTION ENCOUNTER (OUTPATIENT)
Age: 73
End: 2023-09-11

## 2023-09-28 ENCOUNTER — APPOINTMENT (OUTPATIENT)
Dept: INTERNAL MEDICINE | Facility: CLINIC | Age: 73
End: 2023-09-28
Payer: MEDICARE

## 2023-09-28 VITALS
HEART RATE: 66 BPM | OXYGEN SATURATION: 97 % | DIASTOLIC BLOOD PRESSURE: 82 MMHG | WEIGHT: 225 LBS | RESPIRATION RATE: 15 BRPM | TEMPERATURE: 97.9 F | HEIGHT: 69 IN | SYSTOLIC BLOOD PRESSURE: 132 MMHG | BODY MASS INDEX: 33.33 KG/M2

## 2023-09-28 DIAGNOSIS — Z00.00 ENCOUNTER FOR GENERAL ADULT MEDICAL EXAMINATION W/OUT ABNORMAL FINDINGS: ICD-10-CM

## 2023-09-28 DIAGNOSIS — G47.00 INSOMNIA, UNSPECIFIED: ICD-10-CM

## 2023-09-28 DIAGNOSIS — E66.9 OBESITY, UNSPECIFIED: ICD-10-CM

## 2023-09-28 PROCEDURE — G0439: CPT

## 2023-09-28 PROCEDURE — 99213 OFFICE O/P EST LOW 20 MIN: CPT | Mod: 25

## 2023-09-28 RX ORDER — POLYETHYLENE GLYCOL 3350, SODIUM SULFATE, SODIUM CHLORIDE, POTASSIUM CHLORIDE, SODIUM ASCORBATE, AND ASCORBIC ACID 7.5-2.691G
100 KIT ORAL
Qty: 1 | Refills: 0 | Status: DISCONTINUED | COMMUNITY
Start: 2022-11-15 | End: 2023-09-28

## 2023-09-28 RX ORDER — NALOXONE HYDROCHLORIDE 4 MG/.1ML
4 SPRAY NASAL
Qty: 2 | Refills: 0 | Status: DISCONTINUED | COMMUNITY
Start: 2023-01-03 | End: 2023-09-28

## 2023-10-02 ENCOUNTER — TRANSCRIPTION ENCOUNTER (OUTPATIENT)
Age: 73
End: 2023-10-02

## 2024-01-02 NOTE — ED STATDOCS - WET READ LAUNCH FT
Discharge Diagnosis  C. difficile colitis  Hypokalemia  Paroxysmal atrial fibrillation  Gait instability    Issues Requiring Follow-Up  Paroxysmal atrial fibrillation, follow-up with Dr. Chisholm as outpatient  CLL needs to follow-up with his oncologist    BMP on January 10  Discharge Meds     Your medication list        START taking these medications        Instructions Last Dose Given Next Dose Due   amiodarone 200 mg tablet  Commonly known as: Pacerone  Start taking on: January 3, 2024      Take 1 tablet (200 mg) by mouth once daily. Do not start before January 3, 2024.       apixaban 2.5 mg tablet  Commonly known as: Eliquis      Take 1 tablet (2.5 mg) by mouth 2 times a day.       cholestyramine light 4 gram packet  Commonly known as: Prevalite  Replaces: cholestyramine-aspartame 4 gram packet      Take 1 packet (4 g) by mouth once daily in the evening. Take with meals.       lactobacillus acidophilus tablet tablet  Replaces: lactobacillus acidoph-lactobacillus bulgar 0.5 million cell tablet chewable split tablet      Take 1 tablet by mouth 2 times a day with meals.       metoprolol succinate XL 25 mg 24 hr tablet  Commonly known as: Toprol-XL  Start taking on: January 3, 2024      Take 0.5 tablets (12.5 mg) by mouth once daily. Do not crush or chew. Do not start before January 3, 2024.       potassium chloride CR 20 mEq ER tablet  Commonly known as: Klor-Con M20      Take 1 tablet (20 mEq) by mouth 2 times a day. Do not crush or chew.       vancomycin 125 mg capsule  Commonly known as: Vancocin      Take 1 capsule (125 mg) by mouth 4 times a day for 6 days.              CHANGE how you take these medications        Instructions Last Dose Given Next Dose Due   acetaminophen 325 mg tablet  Commonly known as: Tylenol  What changed: Another medication with the same name was added. Make sure you understand how and when to take each.           acetaminophen 325 mg tablet  Commonly known as: Tylenol  What changed:  You were already taking a medication with the same name, and this prescription was added. Make sure you understand how and when to take each.      Take 2 tablets (650 mg) by mouth every 4 hours if needed for mild pain (1 - 3).       amitriptyline 10 mg tablet  Commonly known as: Elavil  What changed: See the new instructions.      Take 1 tablet (10 mg) by mouth once daily at bedtime.       folic acid 1 mg tablet  Commonly known as: Folvite  Start taking on: January 3, 2024  What changed:   medication strength  See the new instructions.      Take 1 tablet (1 mg) by mouth once daily. Do not start before January 3, 2024.       furosemide 20 mg tablet  Commonly known as: Lasix  Start taking on: January 3, 2024  What changed: how much to take      Take 3 tablets (60 mg) by mouth once daily. Do not start before January 3, 2024.       simvastatin 10 mg tablet  Commonly known as: Zocor  What changed: See the new instructions.      Take 1 tablet (10 mg) by mouth once daily at bedtime.              CONTINUE taking these medications        Instructions Last Dose Given Next Dose Due   cholecalciferol 50 MCG (2000 UT) tablet  Commonly known as: Vitamin D-3           DULoxetine 20 mg DR capsule  Commonly known as: Cymbalta  Start taking on: January 3, 2024      Take 1 capsule (20 mg) by mouth once daily. Do not crush or chew. Do not start before January 3, 2024.       gabapentin 300 mg capsule  Commonly known as: Neurontin      Take 1 capsule (300 mg) by mouth 2 times a day.       levothyroxine 150 mcg tablet  Commonly known as: Synthroid, Levoxyl      Take 1 tablet (150 mcg) by mouth early in the morning.. Do not start before December 20, 2023.       pantoprazole 40 mg EC tablet  Commonly known as: ProtoNix           sodium bicarbonate 650 mg tablet      Take 1 tablet (650 mg) by mouth 3 times a day.       Vitamin B-12 100 mcg tablet  Generic drug: cyanocobalamin                  STOP taking these medications       cholestyramine-aspartame 4 gram packet  Commonly known as: Prevalite  Replaced by: cholestyramine light 4 gram packet        hydrALAZINE 10 mg tablet  Commonly known as: Apresoline        lactobacillus acidoph-lactobacillus bulgar 0.5 million cell tablet chewable split tablet  Replaced by: lactobacillus acidophilus tablet tablet        SaltStable LS cream  Generic drug: cream base no.135 (bulk)                  Where to Get Your Medications        These medications were sent to [facility]  [address] [place] [address]      Hours: 9 AM to 6 PM Mon-Fri, 9 AM to 1 PM Sat Phone: [phone]   acetaminophen 325 mg tablet       Information about where to get these medications is not yet available    Ask your nurse or doctor about these medications  amiodarone 200 mg tablet  amitriptyline 10 mg tablet  apixaban 2.5 mg tablet  cholestyramine light 4 gram packet  DULoxetine 20 mg DR capsule  folic acid 1 mg tablet  furosemide 20 mg tablet  lactobacillus acidophilus tablet tablet  metoprolol succinate XL 25 mg 24 hr tablet  potassium chloride CR 20 mEq ER tablet  simvastatin 10 mg tablet  vancomycin 125 mg capsule         Test Results Pending At Discharge  Pending Labs       Order Current Status    Magnesium In process            Hospital Course   History:Tracie Baltazar is a 83 y.o. female presenting with Weakness.           Tracie Baltazar is a 83 y.o. female presenting with a history of hypertension, hypothyroidism, IBS, chronic kidney disease stage IV, CLL.        Patient arrives for evaluation of weakness. Patient had sudden weakness in her legs while walking to bathroom. Patient stated she had to sit on the floor for an hour until her son found her. Patient denies hitting her head.         Today's ED diagnostic workup ordered for a increase in WBC count from 29.3-44.1.  Patient's H&H is 11.1/36.1.  Blood count is stable at 299.  Blood chemistry noted for elevated glucose of 130.   Bicarbonate level is low at 22.  Creatinine is increased to 2.7 from 2.2.  Urinalysis has little bit of blood and glucose.  No evidence for UTI.  Chest x-ray official reading still pending but possibly minimal congestion compared to last chest x-ray.        Appears patient was newly discharged from [facility] on December 19, 2023.        She presented at that time with what sounded like a presyncopal episode.  Developed weakness and felt like she was going to faint.  Brought to the ED.  NIH was 0.  Subsequently admitted December 14, 2023 for acute CHF exacerbation and JANET.  Given IV Lasix.  Seen by nephrology and by cardiology.  Found to have an LVEF of 60 to 65%.  Her oral Lasix and Cozaar were held initially for JANET.  Noted to have labile blood pressures.  Her Synthroid was increased from 137 mcg to 150.  She was discharged on oral Lasix started December 21st. Her hydralazine was decreased 25 mg p.o. 3 times daily to 20 mg p.o. 3 times daily.  Discharged in sodium bicarbonate 650 mg p.o. Q8 hourly.     She was seen by abimbola on 12/21/2023.  She was evaluated by Lakia Leyva CNP.  Patient was at her baseline then.  She was glad to be back home.  She stated that she still having difficulty walking around the house.  It was noted that she declined skilled nursing facility last hospitalization.  He was deemed to be homebound.  Patient was diagnosed with C. difficile colitis.  She was treated with oral vancomycin, diarrhea resolved.  Plan is to finish vancomycin for 2 weeks total course of treatment.  Patient was diagnosed with paroxysmal atrial fibrillation she had tendency to pauses so we were worried to give high doses of medications with amna blocking effect.  Final decision was made to use amiodarone..  Patient was consulted by cardiologist and electrophysiologist.  Currently patient is on amiodarone 200 mg and metoprolol 12.5 mg daily.  She converted to sinus rhythm.  There was discussion  about anticoagulation with given history of gait instability and falls.  Patient carries high risk of stroke so the decision was made to put patient on low-dose of Eliquis 2.5 mg twice a day.  Patient will need to follow with Dr. Chisholm as outpatient, have Holter monitor.  Patient will be evaluated for possible pacemaker placement.  Patient is being discharged in stable condition to an acute rehab.  She was approved to go to [facility].  Discharge recommendations: Finish vancomycin 2 weeks.  Continue amiodarone 200 mg, Eliquis 2.5 mg twice a day  BMP in 1 week.  Discharge planning was discussed with Do Landaverde, nurse practitioner with electrophysiology.  Total time spent with patient today 40 minutes.    Pertinent Physical Exam At Time of Discharge  Physical Exam  Pt is NAD.  Cooperative with exam.  In no distress at rest.    Face is symmetrical.  Skin - no lesions.  Lungs: clear to auscultations B/L. No wheezes, rales, rhonchi.  Heart: regular S1S2.  Abdomen: soft, NT, ND. BS positive.  Extr.: no edema, cords, cyanosis.  Moves all extr.   Outpatient Follow-Up  Future Appointments   Date Time Provider Department Center   1/3/2024  3:30 PM Aidan Wright DO [id number] [facility]   1/15/2024  9:00 AM JUMA Antonio-CNP [id number] None   4/22/2024  1:30 PM JUMA Durham-CNP [id number] [facility]         Lynn Mccullough MD   There are no Wet Read(s) to document.

## 2024-01-30 ENCOUNTER — RX RENEWAL (OUTPATIENT)
Age: 74
End: 2024-01-30

## 2024-01-30 RX ORDER — BETAMETHASONE DIPROPIONATE 0.5 MG/G
0.05 OINTMENT TOPICAL TWICE DAILY
Qty: 45 | Refills: 0 | Status: ACTIVE | COMMUNITY
Start: 2021-05-25 | End: 1900-01-01

## 2024-02-01 ENCOUNTER — OFFICE (OUTPATIENT)
Dept: URBAN - METROPOLITAN AREA CLINIC 102 | Facility: CLINIC | Age: 74
Setting detail: OPHTHALMOLOGY
End: 2024-02-01
Payer: MEDICARE

## 2024-02-01 DIAGNOSIS — H43.811: ICD-10-CM

## 2024-02-01 DIAGNOSIS — H35.3122: ICD-10-CM

## 2024-02-01 DIAGNOSIS — H35.3111: ICD-10-CM

## 2024-02-01 DIAGNOSIS — H40.033: ICD-10-CM

## 2024-02-01 PROCEDURE — 92134 CPTRZ OPH DX IMG PST SGM RTA: CPT | Performed by: OPHTHALMOLOGY

## 2024-02-01 PROCEDURE — 92020 GONIOSCOPY: CPT | Performed by: OPHTHALMOLOGY

## 2024-02-01 PROCEDURE — 92014 COMPRE OPH EXAM EST PT 1/>: CPT | Performed by: OPHTHALMOLOGY

## 2024-02-01 ASSESSMENT — LID POSITION - DERMATOCHALASIS
OS_DERMATOCHALASIS: LLL LUL 1+ 2+
OD_DERMATOCHALASIS: RLL RUL 1+ 2+

## 2024-02-01 ASSESSMENT — REFRACTION_AUTOREFRACTION
OD_AXIS: 105
OS_AXIS: 064
OD_SPHERE: +1.00
OD_CYLINDER: -0.50
OS_CYLINDER: -0.75
OS_SPHERE: +1.50

## 2024-02-01 ASSESSMENT — CONFRONTATIONAL VISUAL FIELD TEST (CVF)
OD_FINDINGS: FULL
OS_FINDINGS: FULL

## 2024-02-01 ASSESSMENT — SPHEQUIV_DERIVED
OS_SPHEQUIV: 1.125
OD_SPHEQUIV: 0.75

## 2024-03-18 ENCOUNTER — RX RENEWAL (OUTPATIENT)
Age: 74
End: 2024-03-18

## 2024-03-18 RX ORDER — ZOLPIDEM TARTRATE 5 MG/1
5 TABLET ORAL
Qty: 30 | Refills: 5 | Status: ACTIVE | COMMUNITY
Start: 2023-09-28 | End: 1900-01-01

## 2024-03-21 ENCOUNTER — NON-APPOINTMENT (OUTPATIENT)
Age: 74
End: 2024-03-21

## 2024-03-21 LAB
25(OH)D3 SERPL-MCNC: 39.3 NG/ML
ALBUMIN SERPL ELPH-MCNC: 4 G/DL
ALP BLD-CCNC: 64 U/L
ALT SERPL-CCNC: 20 U/L
ANION GAP SERPL CALC-SCNC: 10 MMOL/L
AST SERPL-CCNC: 17 U/L
BILIRUB SERPL-MCNC: 0.3 MG/DL
BUN SERPL-MCNC: 16 MG/DL
CALCIUM SERPL-MCNC: 8.6 MG/DL
CHLORIDE SERPL-SCNC: 106 MMOL/L
CHOLEST SERPL-MCNC: 145 MG/DL
CO2 SERPL-SCNC: 24 MMOL/L
CREAT SERPL-MCNC: 1.06 MG/DL
EGFR: 74 ML/MIN/1.73M2
GLUCOSE SERPL-MCNC: 105 MG/DL
HCT VFR BLD CALC: 44.7 %
HDLC SERPL-MCNC: 43 MG/DL
HGB BLD-MCNC: 14.5 G/DL
LDLC SERPL CALC-MCNC: 87 MG/DL
MCHC RBC-ENTMCNC: 30.3 PG
MCHC RBC-ENTMCNC: 32.4 GM/DL
MCV RBC AUTO: 93.5 FL
NONHDLC SERPL-MCNC: 103 MG/DL
PLATELET # BLD AUTO: 235 K/UL
POTASSIUM SERPL-SCNC: 4.5 MMOL/L
PROT SERPL-MCNC: 6.4 G/DL
RBC # BLD: 4.78 M/UL
RBC # FLD: 13.6 %
SODIUM SERPL-SCNC: 139 MMOL/L
T3FREE SERPL-MCNC: 2.79 PG/ML
T4 FREE SERPL-MCNC: 1 NG/DL
TRIGL SERPL-MCNC: 84 MG/DL
TSH SERPL-ACNC: 5.28 UIU/ML
WBC # FLD AUTO: 7.56 K/UL

## 2024-03-22 ENCOUNTER — NON-APPOINTMENT (OUTPATIENT)
Age: 74
End: 2024-03-22

## 2024-03-22 LAB
APPEARANCE: CLEAR
BACTERIA: NEGATIVE /HPF
BILIRUBIN URINE: NEGATIVE
BLOOD URINE: NEGATIVE
CAST: 0 /LPF
COLOR: NORMAL
EPITHELIAL CELLS: 1 /HPF
ESTIMATED AVERAGE GLUCOSE: 123 MG/DL
GLUCOSE QUALITATIVE U: NEGATIVE MG/DL
HBA1C MFR BLD HPLC: 5.9 %
KETONES URINE: NEGATIVE MG/DL
LEUKOCYTE ESTERASE URINE: NEGATIVE
MICROSCOPIC-UA: NORMAL
NITRITE URINE: NEGATIVE
PH URINE: 5.5
PROTEIN URINE: NORMAL MG/DL
PSA SERPL-MCNC: <0.01 NG/ML
RED BLOOD CELLS URINE: 3 /HPF
SPECIFIC GRAVITY URINE: 1.02
UROBILINOGEN URINE: 0.2 MG/DL
WHITE BLOOD CELLS URINE: 1 /HPF

## 2024-04-15 ENCOUNTER — APPOINTMENT (OUTPATIENT)
Dept: INTERNAL MEDICINE | Facility: CLINIC | Age: 74
End: 2024-04-15
Payer: MEDICARE

## 2024-04-15 ENCOUNTER — RX RENEWAL (OUTPATIENT)
Age: 74
End: 2024-04-15

## 2024-04-15 ENCOUNTER — NON-APPOINTMENT (OUTPATIENT)
Age: 74
End: 2024-04-15

## 2024-04-15 VITALS
HEIGHT: 69 IN | RESPIRATION RATE: 16 BRPM | BODY MASS INDEX: 32.58 KG/M2 | WEIGHT: 220 LBS | DIASTOLIC BLOOD PRESSURE: 91 MMHG | OXYGEN SATURATION: 96 % | TEMPERATURE: 98.4 F | SYSTOLIC BLOOD PRESSURE: 139 MMHG | HEART RATE: 77 BPM

## 2024-04-15 VITALS — DIASTOLIC BLOOD PRESSURE: 82 MMHG | SYSTOLIC BLOOD PRESSURE: 128 MMHG

## 2024-04-15 DIAGNOSIS — J98.4 OTHER DISORDERS OF LUNG: ICD-10-CM

## 2024-04-15 DIAGNOSIS — G89.29 LOW BACK PAIN, UNSPECIFIED: ICD-10-CM

## 2024-04-15 DIAGNOSIS — M54.50 LOW BACK PAIN, UNSPECIFIED: ICD-10-CM

## 2024-04-15 DIAGNOSIS — R73.03 PREDIABETES.: ICD-10-CM

## 2024-04-15 DIAGNOSIS — J45.20 MILD INTERMITTENT ASTHMA, UNCOMPLICATED: ICD-10-CM

## 2024-04-15 DIAGNOSIS — C61 MALIGNANT NEOPLASM OF PROSTATE: ICD-10-CM

## 2024-04-15 DIAGNOSIS — Z00.00 ENCOUNTER FOR GENERAL ADULT MEDICAL EXAMINATION W/OUT ABNORMAL FINDINGS: ICD-10-CM

## 2024-04-15 DIAGNOSIS — F09 UNSPECIFIED MENTAL DISORDER DUE TO KNOWN PHYSIOLOGICAL CONDITION: ICD-10-CM

## 2024-04-15 PROCEDURE — ZZZZZ: CPT

## 2024-04-15 PROCEDURE — 94727 GAS DIL/WSHOT DETER LNG VOL: CPT

## 2024-04-15 PROCEDURE — 94729 DIFFUSING CAPACITY: CPT

## 2024-04-15 PROCEDURE — 93000 ELECTROCARDIOGRAM COMPLETE: CPT

## 2024-04-15 PROCEDURE — 99214 OFFICE O/P EST MOD 30 MIN: CPT | Mod: 25

## 2024-04-15 PROCEDURE — 94060 EVALUATION OF WHEEZING: CPT

## 2024-04-15 RX ORDER — HYDROCODONE BITARTRATE AND ACETAMINOPHEN 10; 325 MG/1; MG/1
10-325 TABLET ORAL
Refills: 0 | Status: ACTIVE | COMMUNITY
Start: 2023-01-03

## 2024-04-15 RX ORDER — QUININE SULFATE 324 MG/1
324 CAPSULE ORAL
Qty: 30 | Refills: 11 | Status: ACTIVE | COMMUNITY
Start: 2023-03-20 | End: 1900-01-01

## 2024-04-15 NOTE — ADDENDUM
[FreeTextEntry1] : This note was written by Corinne Dela Cruz on 04/15/2024 acting as a medical scribe for Dr. Augie Childers MD.

## 2024-04-15 NOTE — HISTORY OF PRESENT ILLNESS
[Spouse] : spouse [FreeTextEntry1] :  The patient comes in for a routine follow-up evaluation. [de-identified] :  The patient is feeling well at this time. The patient has a history of chronic lower back pain. The patient continues to complain of lower back pain. He continues to follow with a pain management specialist, Dr. Falk. The patient does continue to take Vicodin  MG when needed as well as Cyclobenzaprine 10 MG one tablet TID as needed and Lyrica 150 MG BID. The patient notes some reluctance to take Vicodin, therefore, he uses Aspirin consistently. He recently underwent a radiofrequency ablation on Thursday 4/11, and notes that he has already had improvement in his symptoms.   The patient also has a history of nocturnal leg cramps. The patient continues to take Quinine 324 MG once daily. The patient notes significant improvement in his symptoms since beginning this medication.   The patient has a history of BERNY. He has not been compliant with wearing his CPAP device due to intolerance for the device. The BERNY remains untreated at this time.  He returned to his CPAP mask, over 10 years ago.  There have been no fevers, chills, or night sweats. Of note, patient reports some recent memory loss. He does note difficulty sleeping. The patient does take Zolpidem 5 MG once nocturnally for a history of insomnia.   The patient does have a history of prostate cancer and is s/p Robotic Prostatectomy years ago. He does not currently follow with a urologist. He denies any bowel or urinary changes at this time.   The patient does not do any formal exercise or cardiovascular exercise, but states that he plays golf occasionally. There has been no chest pain, shortness of breath, palpitations, or PND. There have been no other acute constitutional symptoms. He comes in for this assessment.

## 2024-04-15 NOTE — PLAN
[FreeTextEntry1] : 1. Continue current medications as outlined above.  2. The patient will undergo a home polysomnogram to reevaluate his prior history of BERNY, which is currently not treated.  Of note is the fact that the patient has been having mild cognitive issues as of late.  I have told him that untreated sleep apnea, may be contributing in this regard.   3. Follow up in 6 months with wellness.   4. Continue to follow with pain management specialist, Dr. Falk, for chronic lower back pain.   5. Cardiovascular exercise as tolerated.  Diet and weight loss have been stressed.  He does not make healthy choices.  He consumes significant amounts of carbohydrates.  6. The flu vaccination will be recommended in the fall.  7.  If the patient is again documented as having obstructive sleep apnea, and he does not notice any improvement in his cognitive issues, if he remains compliant with his CPAP, we would then consider neurological assessment for his mild cognitive issues.

## 2024-04-15 NOTE — PHYSICAL EXAM
[General Appearance - Alert] : alert [General Appearance - In No Acute Distress] : in no acute distress [Neck Appearance] : the appearance of the neck was normal [Neck Cervical Mass (___cm)] : no neck mass was observed [Jugular Venous Distention Increased] : there was no jugular-venous distention [Thyroid Diffuse Enlargement] : the thyroid was not enlarged [Thyroid Nodule] : there were no palpable thyroid nodules [Heart Rate And Rhythm] : heart rate was normal and rhythm regular [Heart Sounds] : normal S1 and S2 [Heart Sounds Gallop] : no gallops [Murmurs] : no murmurs [Heart Sounds Pericardial Friction Rub] : no pericardial rub [Edema] : there was no peripheral edema [Bowel Sounds] : normal bowel sounds [Abdomen Soft] : soft [Abdomen Tenderness] : non-tender [] : no hepato-splenomegaly [Abdomen Mass (___ Cm)] : no abdominal mass palpated [Cervical Lymph Nodes Enlarged Posterior Bilaterally] : posterior cervical [Cervical Lymph Nodes Enlarged Anterior Bilaterally] : anterior cervical [Supraclavicular Lymph Nodes Enlarged Bilaterally] : supraclavicular [Nail Clubbing] : no clubbing  or cyanosis of the fingernails [Coordination Grossly Intact] : coordination grossly intact [Normal Gait] : normal gait [Normal Affect] : the affect was normal [Normal Insight/Judgement] : insight and judgment were intact [FreeTextEntry1] : Carotids are 1-2+ bilaterally.

## 2024-04-15 NOTE — DATA REVIEWED
[FreeTextEntry1] : EKG reveals sinus rhythm at a rate of 62.  APCs are noted.  There is a minimal left axis deviation.  There is a flat T wave in 3.  There are no acute ST-T wave changes noted.  A pulmonary function test is performed.  Lung volumes reveal a mild decrease in the total lung capacity and vital capacity.  The RV and FRC are significantly reduced.  Lung mechanics reveal a mild decrease in flow rates.  Minimal bronchodilator reactivity is noted.  The saturation is maintained.  The DLCO is at the low end of normal.  This represents a mild to moderate degree of restriction which may be due to the patient's centripetal obesity.  The DLCO is preserved

## 2024-04-26 ENCOUNTER — OUTPATIENT (OUTPATIENT)
Dept: OUTPATIENT SERVICES | Facility: HOSPITAL | Age: 74
LOS: 1 days | End: 2024-04-26
Payer: MEDICARE

## 2024-04-26 DIAGNOSIS — Z90.79 ACQUIRED ABSENCE OF OTHER GENITAL ORGAN(S): Chronic | ICD-10-CM

## 2024-04-26 DIAGNOSIS — G47.33 OBSTRUCTIVE SLEEP APNEA (ADULT) (PEDIATRIC): ICD-10-CM

## 2024-04-26 DIAGNOSIS — Z98.890 OTHER SPECIFIED POSTPROCEDURAL STATES: Chronic | ICD-10-CM

## 2024-04-26 PROCEDURE — 95800 SLP STDY UNATTENDED: CPT

## 2024-04-26 PROCEDURE — G0400: CPT | Mod: 26

## 2024-05-14 DIAGNOSIS — G47.33 OBSTRUCTIVE SLEEP APNEA (ADULT) (PEDIATRIC): ICD-10-CM

## 2024-07-01 ENCOUNTER — OUTPATIENT (OUTPATIENT)
Dept: OUTPATIENT SERVICES | Facility: HOSPITAL | Age: 74
LOS: 1 days | End: 2024-07-01
Payer: MEDICARE

## 2024-07-01 DIAGNOSIS — Z98.890 OTHER SPECIFIED POSTPROCEDURAL STATES: Chronic | ICD-10-CM

## 2024-07-01 DIAGNOSIS — G47.33 OBSTRUCTIVE SLEEP APNEA (ADULT) (PEDIATRIC): ICD-10-CM

## 2024-07-01 DIAGNOSIS — Z90.79 ACQUIRED ABSENCE OF OTHER GENITAL ORGAN(S): Chronic | ICD-10-CM

## 2024-07-01 PROCEDURE — 95811 POLYSOM 6/>YRS CPAP 4/> PARM: CPT | Mod: 26

## 2024-07-01 PROCEDURE — 95811 POLYSOM 6/>YRS CPAP 4/> PARM: CPT

## 2024-07-11 ENCOUNTER — NON-APPOINTMENT (OUTPATIENT)
Age: 74
End: 2024-07-11

## 2024-08-08 ENCOUNTER — OFFICE (OUTPATIENT)
Dept: URBAN - METROPOLITAN AREA CLINIC 102 | Facility: CLINIC | Age: 74
Setting detail: OPHTHALMOLOGY
End: 2024-08-08
Payer: MEDICARE

## 2024-08-08 DIAGNOSIS — H35.3122: ICD-10-CM

## 2024-08-08 DIAGNOSIS — H35.3111: ICD-10-CM

## 2024-08-08 DIAGNOSIS — H25.13: ICD-10-CM

## 2024-08-08 DIAGNOSIS — H43.392: ICD-10-CM

## 2024-08-08 DIAGNOSIS — H43.811: ICD-10-CM

## 2024-08-08 DIAGNOSIS — H40.033: ICD-10-CM

## 2024-08-08 PROCEDURE — 92020 GONIOSCOPY: CPT | Performed by: OPHTHALMOLOGY

## 2024-08-08 PROCEDURE — 92134 CPTRZ OPH DX IMG PST SGM RTA: CPT | Performed by: OPHTHALMOLOGY

## 2024-08-08 PROCEDURE — 92014 COMPRE OPH EXAM EST PT 1/>: CPT | Performed by: OPHTHALMOLOGY

## 2024-08-08 ASSESSMENT — LID POSITION - DERMATOCHALASIS
OD_DERMATOCHALASIS: RLL RUL 1+ 2+
OS_DERMATOCHALASIS: LLL LUL 1+ 2+

## 2024-08-08 ASSESSMENT — CONFRONTATIONAL VISUAL FIELD TEST (CVF)
OD_FINDINGS: FULL
OS_FINDINGS: FULL

## 2024-09-19 ENCOUNTER — APPOINTMENT (OUTPATIENT)
Dept: INTERNAL MEDICINE | Facility: CLINIC | Age: 74
End: 2024-09-19
Payer: MEDICARE

## 2024-09-19 VITALS
SYSTOLIC BLOOD PRESSURE: 112 MMHG | HEART RATE: 84 BPM | WEIGHT: 224 LBS | BODY MASS INDEX: 33.08 KG/M2 | DIASTOLIC BLOOD PRESSURE: 78 MMHG | OXYGEN SATURATION: 95 % | TEMPERATURE: 97.9 F

## 2024-09-19 DIAGNOSIS — G47.00 INSOMNIA, UNSPECIFIED: ICD-10-CM

## 2024-09-19 DIAGNOSIS — G47.33 OBSTRUCTIVE SLEEP APNEA (ADULT) (PEDIATRIC): ICD-10-CM

## 2024-09-19 PROCEDURE — G2211 COMPLEX E/M VISIT ADD ON: CPT

## 2024-09-19 PROCEDURE — 99214 OFFICE O/P EST MOD 30 MIN: CPT

## 2024-09-20 NOTE — RESULTS/DATA
[TextEntry] : PSG 4/26/24 AHi 41 events/hr verna 77%  CPAP titraiton 7/1/24 Suboptimal titration due to "horrible sleep"

## 2024-09-20 NOTE — REVIEW OF SYSTEMS
[TextEntry] : - General: Daytime sleepiness, occasional naps - Neurological: No morning headaches reported - Respiratory: No nasal congestion, clear lungs on exam - Cardiovascular: No chest pain mentioned - Gastrointestinal: No issues reported - Genitourinary: Frequent nocturnal urination - Musculoskeletal: Leg cramps, back pain - Rest of review of systems were negative

## 2024-09-20 NOTE — HISTORY OF PRESENT ILLNESS
[FreeTextEntry1] : The patient, a 74-year-old male, presents with complaints of not sleeping through the night. He has undergone two sleep apnea tests, including one at a sleep clinic, which indicated "horrible sleep". The patient's sleep schedule typically involves going to bed around 1-2 AM and taking Ambien (zolpidem) as prescribed, initially at 10 mg, later reduced to 5 mg. Despite this, he wakes up multiple times during the night, primarily to urinate, and experiences leg cramps, for which he is on quinine.  The patient has a long history of working night shifts, which may contribute to his current sleep issues. He falls asleep quickly, within 10-15 minutes, whether he takes Ambien or not. However, staying asleep remains problematic; he wakes up at least twice to urinate. He has a history of prostate cancer and has undergone multiple back surgeries, including a laminectomy and discectomy, which contribute to his pain and discomfort, potentially affecting his sleep.  The patient has also reported using CPAP in the past but found it uncomfortable and likened it to feeling choked. He experiences daytime sleepiness and occasionally naps, particularly if he goes golfing early in the morning. He has tried various medications and therapies for pain management, including Vicodin (hydrocodone/acetaminophen) and Tylenol (acetaminophen) for arthritis.  Sleep Schedule: - Bedtime: 1-2 AM - Time to fall asleep: 10-15 minutes - Nocturnal awakenings: At least twice per night to urinate - Daytime sleepiness: Yes, occasional naps especially after early morning activities - Associated symptoms: Leg cramps, managed with quinine - Prior sleep medicine workup: Two sleep apnea tests, one at home and one in a clinic - Prior CPAP therapy: Previously used but found uncomfortable and ineffective  Relevant Medications: - Ambien (zolpidem) 5 mg at bedtime - Quinine for leg cramps - Vicodin (hydrocodone/acetaminophen) for severe pain as needed - Tylenol (acetaminophen) for arthritis pain - Cyclobenzaprine for muscle spasms  Social History: - Smoking: No current smoking history - Alcohol: Consumes alcohol socially

## 2024-09-20 NOTE — PLAN
[TextEntry] : The patient is a 74-year-old male with a history of prostate cancer, chronic back pain, insomnia, and severe sleep apnea. He came in for a follow-up visit for evaluation of sleep disturbances and sleep apnea.  - Severe Obstructive Sleep Apnea: The patient has severe sleep apnea confirmed by sleep studies. CPAP therapy was previously attempted but was uncomfortable for the patient. I discussed the pathophysiology of BERNY, including the collapse of the throat leading to reduced oxygen levels, which can cause high blood pressure and heart disease.   - Plan:     - Order a BiPAP machine, which provides variable pressure and may be more comfortable for the patient. even though the titration was suboptimal, the patient is not ready for another PAP titration. I reviewed the titration table on the sleep study and 18/14 appeared to be a good starting point. Further adjustments based on compliance data.      - Add humidification to the BiPAP to prevent nasal dryness.     - Provide instructions on sleep hygiene.     - Advise patient to try different CPAP masks for better comfort.     - Consider referral to a surgeon for Inspire therapy if CPAP tolerance remains low.  - Insomnia: The patient has difficulty staying asleep, which could be related to nocturnal urination and chronic pain. He does not have sleep initiation insomnia   - Plan:     - Minimize the use of Ambien due to age-related risks. Discussed the black box warning associated with ambien as well.      - Encourage regular exercise and physical therapy to improve overall sleep quality. Discussed sleep hygiene measures     - Refer to a urologist to evaluate and manage nocturia that is causing insomnia  Follow up: - Follow up in three months for reassessment of sleep apnea management and BiPAP efficacy.

## 2024-11-01 ENCOUNTER — EMERGENCY (EMERGENCY)
Facility: HOSPITAL | Age: 74
LOS: 0 days | Discharge: HOME | End: 2024-11-01
Attending: STUDENT IN AN ORGANIZED HEALTH CARE EDUCATION/TRAINING PROGRAM
Payer: MEDICARE

## 2024-11-01 VITALS — HEIGHT: 71 IN | WEIGHT: 234.57 LBS

## 2024-11-01 VITALS
OXYGEN SATURATION: 99 % | TEMPERATURE: 99 F | SYSTOLIC BLOOD PRESSURE: 132 MMHG | RESPIRATION RATE: 18 BRPM | DIASTOLIC BLOOD PRESSURE: 73 MMHG | HEART RATE: 57 BPM

## 2024-11-01 DIAGNOSIS — Z98.890 OTHER SPECIFIED POSTPROCEDURAL STATES: Chronic | ICD-10-CM

## 2024-11-01 DIAGNOSIS — Z90.79 ACQUIRED ABSENCE OF OTHER GENITAL ORGAN(S): Chronic | ICD-10-CM

## 2024-11-01 DIAGNOSIS — Z87.442 PERSONAL HISTORY OF URINARY CALCULI: ICD-10-CM

## 2024-11-01 LAB
ALBUMIN SERPL ELPH-MCNC: 3.5 G/DL — SIGNIFICANT CHANGE UP (ref 3.3–5)
ALP SERPL-CCNC: 55 U/L — SIGNIFICANT CHANGE UP (ref 40–120)
ALT FLD-CCNC: 22 U/L — SIGNIFICANT CHANGE UP (ref 12–78)
ANION GAP SERPL CALC-SCNC: 3 MMOL/L — LOW (ref 5–17)
APPEARANCE UR: CLEAR — SIGNIFICANT CHANGE UP
AST SERPL-CCNC: 15 U/L — SIGNIFICANT CHANGE UP (ref 15–37)
BASOPHILS # BLD AUTO: 0.04 K/UL — SIGNIFICANT CHANGE UP (ref 0–0.2)
BASOPHILS NFR BLD AUTO: 0.5 % — SIGNIFICANT CHANGE UP (ref 0–2)
BILIRUB SERPL-MCNC: 0.3 MG/DL — SIGNIFICANT CHANGE UP (ref 0.2–1.2)
BILIRUB UR-MCNC: NEGATIVE — SIGNIFICANT CHANGE UP
BUN SERPL-MCNC: 22 MG/DL — SIGNIFICANT CHANGE UP (ref 7–23)
CALCIUM SERPL-MCNC: 8.8 MG/DL — SIGNIFICANT CHANGE UP (ref 8.5–10.1)
CHLORIDE SERPL-SCNC: 109 MMOL/L — HIGH (ref 96–108)
CO2 SERPL-SCNC: 26 MMOL/L — SIGNIFICANT CHANGE UP (ref 22–31)
COLOR SPEC: YELLOW — SIGNIFICANT CHANGE UP
CREAT SERPL-MCNC: 1.19 MG/DL — SIGNIFICANT CHANGE UP (ref 0.5–1.3)
DIFF PNL FLD: NEGATIVE — SIGNIFICANT CHANGE UP
EGFR: 64 ML/MIN/1.73M2 — SIGNIFICANT CHANGE UP
EOSINOPHIL # BLD AUTO: 0.16 K/UL — SIGNIFICANT CHANGE UP (ref 0–0.5)
EOSINOPHIL NFR BLD AUTO: 2.1 % — SIGNIFICANT CHANGE UP (ref 0–6)
GLUCOSE SERPL-MCNC: 96 MG/DL — SIGNIFICANT CHANGE UP (ref 70–99)
GLUCOSE UR QL: NEGATIVE MG/DL — SIGNIFICANT CHANGE UP
HCT VFR BLD CALC: 42.9 % — SIGNIFICANT CHANGE UP (ref 39–50)
HGB BLD-MCNC: 13.8 G/DL — SIGNIFICANT CHANGE UP (ref 13–17)
IMM GRANULOCYTES NFR BLD AUTO: 0.4 % — SIGNIFICANT CHANGE UP (ref 0–0.9)
KETONES UR-MCNC: NEGATIVE MG/DL — SIGNIFICANT CHANGE UP
LEUKOCYTE ESTERASE UR-ACNC: NEGATIVE — SIGNIFICANT CHANGE UP
LIDOCAIN IGE QN: 18 U/L — SIGNIFICANT CHANGE UP (ref 13–75)
LYMPHOCYTES # BLD AUTO: 2.65 K/UL — SIGNIFICANT CHANGE UP (ref 1–3.3)
LYMPHOCYTES # BLD AUTO: 34.9 % — SIGNIFICANT CHANGE UP (ref 13–44)
MCHC RBC-ENTMCNC: 30.7 PG — SIGNIFICANT CHANGE UP (ref 27–34)
MCHC RBC-ENTMCNC: 32.2 G/DL — SIGNIFICANT CHANGE UP (ref 32–36)
MCV RBC AUTO: 95.5 FL — SIGNIFICANT CHANGE UP (ref 80–100)
MONOCYTES # BLD AUTO: 0.75 K/UL — SIGNIFICANT CHANGE UP (ref 0–0.9)
MONOCYTES NFR BLD AUTO: 9.9 % — SIGNIFICANT CHANGE UP (ref 2–14)
NEUTROPHILS # BLD AUTO: 3.97 K/UL — SIGNIFICANT CHANGE UP (ref 1.8–7.4)
NEUTROPHILS NFR BLD AUTO: 52.2 % — SIGNIFICANT CHANGE UP (ref 43–77)
NITRITE UR-MCNC: NEGATIVE — SIGNIFICANT CHANGE UP
PH UR: 5 — SIGNIFICANT CHANGE UP (ref 5–8)
PLATELET # BLD AUTO: 259 K/UL — SIGNIFICANT CHANGE UP (ref 150–400)
POTASSIUM SERPL-MCNC: 4.6 MMOL/L — SIGNIFICANT CHANGE UP (ref 3.5–5.3)
POTASSIUM SERPL-SCNC: 4.6 MMOL/L — SIGNIFICANT CHANGE UP (ref 3.5–5.3)
PROT SERPL-MCNC: 6.8 GM/DL — SIGNIFICANT CHANGE UP (ref 6–8.3)
PROT UR-MCNC: NEGATIVE MG/DL — SIGNIFICANT CHANGE UP
RBC # BLD: 4.49 M/UL — SIGNIFICANT CHANGE UP (ref 4.2–5.8)
RBC # FLD: 13.1 % — SIGNIFICANT CHANGE UP (ref 10.3–14.5)
SODIUM SERPL-SCNC: 138 MMOL/L — SIGNIFICANT CHANGE UP (ref 135–145)
SP GR SPEC: 1.01 — SIGNIFICANT CHANGE UP (ref 1–1.03)
UROBILINOGEN FLD QL: 0.2 MG/DL — SIGNIFICANT CHANGE UP (ref 0.2–1)
WBC # BLD: 7.6 K/UL — SIGNIFICANT CHANGE UP (ref 3.8–10.5)
WBC # FLD AUTO: 7.6 K/UL — SIGNIFICANT CHANGE UP (ref 3.8–10.5)

## 2024-11-01 PROCEDURE — 74177 CT ABD & PELVIS W/CONTRAST: CPT | Mod: MC

## 2024-11-01 PROCEDURE — 96374 THER/PROPH/DIAG INJ IV PUSH: CPT | Mod: XU

## 2024-11-01 PROCEDURE — 99284 EMERGENCY DEPT VISIT MOD MDM: CPT | Mod: 25

## 2024-11-01 PROCEDURE — 85025 COMPLETE CBC W/AUTO DIFF WBC: CPT

## 2024-11-01 PROCEDURE — 74177 CT ABD & PELVIS W/CONTRAST: CPT | Mod: 26,MC

## 2024-11-01 PROCEDURE — 81003 URINALYSIS AUTO W/O SCOPE: CPT

## 2024-11-01 PROCEDURE — 99285 EMERGENCY DEPT VISIT HI MDM: CPT | Mod: FS

## 2024-11-01 PROCEDURE — 36415 COLL VENOUS BLD VENIPUNCTURE: CPT

## 2024-11-01 PROCEDURE — 83690 ASSAY OF LIPASE: CPT

## 2024-11-01 PROCEDURE — 80053 COMPREHEN METABOLIC PANEL: CPT

## 2024-11-01 RX ORDER — SODIUM CHLORIDE 0.9 % (FLUSH) 0.9 %
1000 SYRINGE (ML) INJECTION ONCE
Refills: 0 | Status: COMPLETED | OUTPATIENT
Start: 2024-11-01 | End: 2024-11-01

## 2024-11-01 RX ORDER — KETOROLAC TROMETHAMINE 10 MG/1
15 TABLET, FILM COATED ORAL ONCE
Refills: 0 | Status: DISCONTINUED | OUTPATIENT
Start: 2024-11-01 | End: 2024-11-01

## 2024-11-01 RX ADMIN — KETOROLAC TROMETHAMINE 15 MILLIGRAM(S): 10 TABLET, FILM COATED ORAL at 17:32

## 2024-11-01 RX ADMIN — Medication 1000 MILLILITER(S): at 17:32

## 2024-11-01 NOTE — ED STATDOCS - PATIENT PORTAL LINK FT
You can access the FollowMyHealth Patient Portal offered by [facility] by registering at the following website: [email or web address]. By joining [facility]’s FollowMyHealth portal, you will also be able to view your health information using other applications (apps) compatible with our system.

## 2024-11-01 NOTE — ED ADULT NURSE NOTE - NS_ED_NURSE_TEACHING_TOPIC_ED_A_ED
Pt educated on all d/c instructions with return verbal understanding of all d/c instructions at this time.

## 2024-11-01 NOTE — ED ADULT NURSE NOTE - NSFALLUNIVINTERV_ED_ALL_ED
Bed/Stretcher in lowest position, wheels locked, appropriate side rails in place/Call bell, personal items and telephone in reach/Instruct patient to call for assistance before getting out of bed/chair/stretcher/Non-slip footwear applied when patient is off stretcher/[place] to call system/Physically safe environment - no spills, clutter or unnecessary equipment/Purposeful proactive rounding/Room/bathroom lighting operational, light cord in reach

## 2024-11-01 NOTE — ED ADULT TRIAGE NOTE - CHIEF COMPLAINT QUOTE
Pt presents to the ED c/o left flank pain x2 days. Denies n/v, fevers, or dysuria. Pt has not taken any medication for pain. PMH of prostate cancer, hernia, and back pain.

## 2024-11-01 NOTE — ED STATDOCS - NSFOLLOWUPINSTRUCTIONS_ED_ALL_ED_FT
Follow up with your PCP.  Take Tylenol 650-1000 mg every 6 hours as needed for pain. Do not exceed 4,000 mg in a 24 hour period. Take Ibuprofen 600-800 mg every 6 hours as needed for moderate pain -- take with food.   Return to the ED for new or concerning symptoms.    Flank Pain, Adult  Flank pain is pain that is located on the side of the body between the upper abdomen and the spine. This area is called the flank. The pain may occur over a short period of time (acute), or it may be long-term or recurring (chronic). It may be mild or severe. Flank pain can be caused by many things, including:  Muscle soreness or injury.  Kidney infection, kidney stones, or kidney disease.  Stress.  A disease of the spine (vertebral disk disease).  A lung infection (pneumonia).  Fluid around the lungs (pulmonary edema).  A skin rash caused by the chickenpox virus (shingles).  Tumors that affect the back of the abdomen.  Gallbladder disease.  Follow these instructions at home:  A comparison of three sample cups showing dark yellow, yellow, and pale yellow urine.  Drink enough fluid to keep your urine pale yellow.  Rest as told by your health care provider.  Take over-the-counter and prescription medicines only as told by your health care provider.  Keep a journal to track what has caused your flank pain and what has made it feel better.  Keep all follow-up visits. This is important.  Contact a health care provider if:  Your pain is not controlled with medicine.  You have new symptoms.  Your pain gets worse.  Your symptoms last longer than 2–3 days.  You have trouble urinating or you are urinating very frequently.  Get help right away if:  You have trouble breathing or you are short of breath.  Your abdomen hurts or it is swollen or red.  You have nausea or vomiting.  You feel faint, or you faint.  You have blood in your urine.  You have flank pain and a fever.  These symptoms may represent a serious problem that is an emergency. Do not wait to see if the symptoms will go away. Get medical help right away. Call your local emergency services (911 in the [place] Do not drive yourself to the hospital.    Summary  Flank pain is pain that is located on the side of the body between the upper abdomen and the spine.  The pain may occur over a short period of time (acute), or it may be long-term or recurring (chronic). It may be mild or severe.  Flank pain can be caused by many things.  Contact your health care provider if your symptoms get worse or last longer than 2–3 days.  This information is not intended to replace advice given to you by your health care provider. Make sure you discuss any questions you have with your health care provider.

## 2024-11-01 NOTE — ED STATDOCS - CLINICAL SUMMARY MEDICAL DECISION MAKING FREE TEXT BOX
presentation most concerning for muscular back pain, pyelonephritis, kidney stone, diverticulitis.  Plan for pain control, labs, UA, CT abdomen pelvis, monitor and reassessment

## 2024-11-01 NOTE — ED STATDOCS - OBJECTIVE STATEMENT
74yoM PMH chronic back pain presents with left flank pain for the last two days. Denies fever, Chills, chest pain, shortness of breath, nausea vomiting, diarrhea, urinary symptoms, trauma or change in activity.  Patient states that he may have had a kidney stone in the past but has never had any procedures including stenting or lithotripsy.  States that the pain is constant is worse when he moves.   past surgical history of hernia repair

## 2024-11-01 NOTE — ED STATDOCS - CCCP TRG CHIEF CMPLNT
Please contact patient and her pharmacy and find out why this medication is being requested.   flank pain

## 2024-11-01 NOTE — ED STATDOCS - PROGRESS NOTE DETAILS
74-year-old male with past medical history of chronic back pain presents emergency department complaining of left flank pain that started 2 days ago, described as constant but worsens with movement, nonradiating.  No use of medication prior to arrival.   Wife at bedside relates he has a history of kidney stones.  Patient denies fevers, chills, chest pain, shortness of breath, nausea, vomiting, diarrhea, dysuria, hematuria.  Vital signs are stable.  Physical exam demonstrates mild left flank tenderness palpation, no guarding or rebound, no CVA tenderness, rest of exam unremarkable.  Plan for labs, CT abdomen pelvis, urinalysis, symptom control, reassess. - Yvonne Farnsworth PA-C Labs and imaging reviewed.  Labs are within normal limits.  UA negative for UTI.  CT abdomen pelvis negative for acute pathology.  Discussed results with patient.  Reassessed, endorses much improvement after medication.  Likely muscular in nature.  Recommend continuing Motrin and/or Tylenol for pain.  Offered muscle relaxer to be sent to pharmacy, patient has Flexeril at home for back pain.  Should return precautions to ED were discussed.  All questions and concerns were addressed. -  Yvonne Farnsworth PA-C

## 2024-11-01 NOTE — ED STATDOCS - PHYSICAL EXAMINATION
Constitutional: well appearing, NAD AAOx3  Eyes: EOMI, PERRL  Head: Normocephalic atraumatic  Mouth: no airway obstruction, posterior oropharynx clear without erythema or exudate  Neck: supple  Cardiac: regular rate and rhythm, no MRG  Resp: Lungs CTAB  GI: Abd  soft, left flank tenderness, no rebound or guarding, no CVA tenderness  Neuro: CN2-12 intact, strength 5/5x4, sensation grossly intact  Skin: No rashes

## 2024-11-01 NOTE — ED ADULT NURSE NOTE - OBJECTIVE STATEMENT
Pt is a 74yr old male, A&OX4 and ambulatory, c/o L sided flank pain x3 days. Worse with movement. Denies N/V, fever/chills, and urinary symptoms. No pain meds taken PTA. Labs drawn and sent as per MD order. Pt in no acute distress. Brought to results waiting.

## 2024-11-11 ENCOUNTER — APPOINTMENT (OUTPATIENT)
Dept: INTERNAL MEDICINE | Facility: CLINIC | Age: 74
End: 2024-11-11
Payer: MEDICARE

## 2024-11-11 VITALS
TEMPERATURE: 98.3 F | WEIGHT: 225 LBS | DIASTOLIC BLOOD PRESSURE: 82 MMHG | HEIGHT: 69 IN | OXYGEN SATURATION: 97 % | BODY MASS INDEX: 33.33 KG/M2 | HEART RATE: 73 BPM | SYSTOLIC BLOOD PRESSURE: 128 MMHG | RESPIRATION RATE: 15 BRPM

## 2024-11-11 DIAGNOSIS — Z00.00 ENCOUNTER FOR GENERAL ADULT MEDICAL EXAMINATION W/OUT ABNORMAL FINDINGS: ICD-10-CM

## 2024-11-11 DIAGNOSIS — C61 MALIGNANT NEOPLASM OF PROSTATE: ICD-10-CM

## 2024-11-11 DIAGNOSIS — N28.1 CYST OF KIDNEY, ACQUIRED: ICD-10-CM

## 2024-11-11 PROCEDURE — 90662 IIV NO PRSV INCREASED AG IM: CPT

## 2024-11-11 PROCEDURE — G0008: CPT

## 2024-11-11 PROCEDURE — G0439: CPT

## 2024-11-25 ENCOUNTER — APPOINTMENT (OUTPATIENT)
Dept: ULTRASOUND IMAGING | Facility: CLINIC | Age: 74
End: 2024-11-25
Payer: MEDICARE

## 2024-11-25 ENCOUNTER — OUTPATIENT (OUTPATIENT)
Dept: OUTPATIENT SERVICES | Facility: HOSPITAL | Age: 74
LOS: 1 days | End: 2024-11-25
Payer: MEDICARE

## 2024-11-25 DIAGNOSIS — Z90.79 ACQUIRED ABSENCE OF OTHER GENITAL ORGAN(S): Chronic | ICD-10-CM

## 2024-11-25 DIAGNOSIS — N28.1 CYST OF KIDNEY, ACQUIRED: ICD-10-CM

## 2024-11-25 DIAGNOSIS — Z98.890 OTHER SPECIFIED POSTPROCEDURAL STATES: Chronic | ICD-10-CM

## 2024-11-25 PROCEDURE — 76775 US EXAM ABDO BACK WALL LIM: CPT

## 2024-11-25 PROCEDURE — 76775 US EXAM ABDO BACK WALL LIM: CPT | Mod: 26

## 2024-12-02 DIAGNOSIS — N28.1 CYST OF KIDNEY, ACQUIRED: ICD-10-CM

## 2024-12-16 ENCOUNTER — APPOINTMENT (OUTPATIENT)
Dept: INTERNAL MEDICINE | Facility: CLINIC | Age: 74
End: 2024-12-16
Payer: MEDICARE

## 2024-12-16 VITALS
TEMPERATURE: 98 F | HEIGHT: 69 IN | HEART RATE: 74 BPM | SYSTOLIC BLOOD PRESSURE: 140 MMHG | DIASTOLIC BLOOD PRESSURE: 90 MMHG | OXYGEN SATURATION: 97 % | WEIGHT: 230 LBS | BODY MASS INDEX: 34.07 KG/M2

## 2024-12-16 DIAGNOSIS — G47.33 OBSTRUCTIVE SLEEP APNEA (ADULT) (PEDIATRIC): ICD-10-CM

## 2024-12-16 PROCEDURE — 99214 OFFICE O/P EST MOD 30 MIN: CPT

## 2024-12-16 PROCEDURE — G2211 COMPLEX E/M VISIT ADD ON: CPT

## 2025-01-30 ENCOUNTER — APPOINTMENT (OUTPATIENT)
Dept: DERMATOLOGY | Facility: CLINIC | Age: 75
End: 2025-01-30
Payer: MEDICARE

## 2025-01-30 DIAGNOSIS — L20.9 ATOPIC DERMATITIS, UNSPECIFIED: ICD-10-CM

## 2025-01-30 DIAGNOSIS — D48.5 NEOPLASM OF UNCERTAIN BEHAVIOR OF SKIN: ICD-10-CM

## 2025-01-30 DIAGNOSIS — R21 RASH AND OTHER NONSPECIFIC SKIN ERUPTION: ICD-10-CM

## 2025-01-30 DIAGNOSIS — L57.0 ACTINIC KERATOSIS: ICD-10-CM

## 2025-01-30 PROCEDURE — 17000 DESTRUCT PREMALG LESION: CPT

## 2025-01-30 PROCEDURE — 99204 OFFICE O/P NEW MOD 45 MIN: CPT | Mod: 25

## 2025-01-30 PROCEDURE — 17003 DESTRUCT PREMALG LES 2-14: CPT

## 2025-01-30 RX ORDER — BETAMETHASONE DIPROPIONATE 0.5 MG/G
1 OINTMENT, AUGMENTED TOPICAL
Refills: 3 | DISCHARGE
Start: 2025-01-30

## 2025-01-30 RX ORDER — BETAMETHASONE DIPROPIONATE 0.5 MG/G
0.05 OINTMENT TOPICAL TWICE DAILY
Qty: 1 | Refills: 3 | Status: ACTIVE | COMMUNITY
Start: 2025-01-30 | End: 1900-01-01

## 2025-02-13 ENCOUNTER — INPATIENT (INPATIENT)
Facility: HOSPITAL | Age: 75
LOS: 1 days | Discharge: HOME | DRG: 313 | End: 2025-02-15
Attending: STUDENT IN AN ORGANIZED HEALTH CARE EDUCATION/TRAINING PROGRAM | Admitting: INTERNAL MEDICINE
Payer: MEDICARE

## 2025-02-13 VITALS — WEIGHT: 225.09 LBS | HEIGHT: 71 IN

## 2025-02-13 DIAGNOSIS — Z90.79 ACQUIRED ABSENCE OF OTHER GENITAL ORGAN(S): Chronic | ICD-10-CM

## 2025-02-13 DIAGNOSIS — Z98.890 OTHER SPECIFIED POSTPROCEDURAL STATES: Chronic | ICD-10-CM

## 2025-02-13 DIAGNOSIS — R07.9 CHEST PAIN, UNSPECIFIED: ICD-10-CM

## 2025-02-13 LAB
ALBUMIN SERPL ELPH-MCNC: 4.2 G/DL — SIGNIFICANT CHANGE UP (ref 3.3–5)
ALP SERPL-CCNC: 68 U/L — SIGNIFICANT CHANGE UP (ref 40–120)
ALT FLD-CCNC: 28 U/L — SIGNIFICANT CHANGE UP (ref 12–78)
ANION GAP SERPL CALC-SCNC: 4 MMOL/L — LOW (ref 5–17)
APTT BLD: 31 SEC — SIGNIFICANT CHANGE UP (ref 24.5–35.6)
AST SERPL-CCNC: 14 U/L — LOW (ref 15–37)
BASOPHILS # BLD AUTO: 0.06 K/UL — SIGNIFICANT CHANGE UP (ref 0–0.2)
BASOPHILS NFR BLD AUTO: 0.5 % — SIGNIFICANT CHANGE UP (ref 0–2)
BILIRUB SERPL-MCNC: 0.6 MG/DL — SIGNIFICANT CHANGE UP (ref 0.2–1.2)
BUN SERPL-MCNC: 18 MG/DL — SIGNIFICANT CHANGE UP (ref 7–23)
CALCIUM SERPL-MCNC: 9.8 MG/DL — SIGNIFICANT CHANGE UP (ref 8.5–10.1)
CHLORIDE SERPL-SCNC: 104 MMOL/L — SIGNIFICANT CHANGE UP (ref 96–108)
CO2 SERPL-SCNC: 28 MMOL/L — SIGNIFICANT CHANGE UP (ref 22–31)
CREAT SERPL-MCNC: 1.28 MG/DL — SIGNIFICANT CHANGE UP (ref 0.5–1.3)
EGFR: 59 ML/MIN/1.73M2 — LOW
EGFR: 59 ML/MIN/1.73M2 — LOW
EOSINOPHIL # BLD AUTO: 0.27 K/UL — SIGNIFICANT CHANGE UP (ref 0–0.5)
EOSINOPHIL NFR BLD AUTO: 2.3 % — SIGNIFICANT CHANGE UP (ref 0–6)
FLUAV AG NPH QL: SIGNIFICANT CHANGE UP
FLUBV AG NPH QL: SIGNIFICANT CHANGE UP
GLUCOSE SERPL-MCNC: 120 MG/DL — HIGH (ref 70–99)
HCT VFR BLD CALC: 48.1 % — SIGNIFICANT CHANGE UP (ref 39–50)
HGB BLD-MCNC: 15.9 G/DL — SIGNIFICANT CHANGE UP (ref 13–17)
IMM GRANULOCYTES # BLD AUTO: 0.06 K/UL — SIGNIFICANT CHANGE UP (ref 0–0.07)
IMM GRANULOCYTES NFR BLD AUTO: 0.5 % — SIGNIFICANT CHANGE UP (ref 0–0.9)
INR BLD: 1.01 RATIO — SIGNIFICANT CHANGE UP (ref 0.85–1.16)
LYMPHOCYTES # BLD AUTO: 2.58 K/UL — SIGNIFICANT CHANGE UP (ref 1–3.3)
LYMPHOCYTES NFR BLD AUTO: 21.7 % — SIGNIFICANT CHANGE UP (ref 13–44)
MAGNESIUM SERPL-MCNC: 2.1 MG/DL — SIGNIFICANT CHANGE UP (ref 1.6–2.6)
MCHC RBC-ENTMCNC: 31.2 PG — SIGNIFICANT CHANGE UP (ref 27–34)
MCHC RBC-ENTMCNC: 33.1 G/DL — SIGNIFICANT CHANGE UP (ref 32–36)
MCV RBC AUTO: 94.5 FL — SIGNIFICANT CHANGE UP (ref 80–100)
MONOCYTES # BLD AUTO: 0.85 K/UL — SIGNIFICANT CHANGE UP (ref 0–0.9)
MONOCYTES NFR BLD AUTO: 7.2 % — SIGNIFICANT CHANGE UP (ref 2–14)
NEUTROPHILS # BLD AUTO: 8.05 K/UL — HIGH (ref 1.8–7.4)
NEUTROPHILS NFR BLD AUTO: 67.8 % — SIGNIFICANT CHANGE UP (ref 43–77)
NRBC # BLD AUTO: 0 K/UL — SIGNIFICANT CHANGE UP (ref 0–0)
NRBC # FLD: 0 K/UL — SIGNIFICANT CHANGE UP (ref 0–0)
NRBC BLD AUTO-RTO: 0 /100 WBCS — SIGNIFICANT CHANGE UP (ref 0–0)
PLATELET # BLD AUTO: 269 K/UL — SIGNIFICANT CHANGE UP (ref 150–400)
PMV BLD: 9.1 FL — SIGNIFICANT CHANGE UP (ref 7–13)
POTASSIUM SERPL-MCNC: 4.5 MMOL/L — SIGNIFICANT CHANGE UP (ref 3.5–5.3)
POTASSIUM SERPL-SCNC: 4.5 MMOL/L — SIGNIFICANT CHANGE UP (ref 3.5–5.3)
PROT SERPL-MCNC: 8 GM/DL — SIGNIFICANT CHANGE UP (ref 6–8.3)
PROTHROM AB SERPL-ACNC: 11.9 SEC — SIGNIFICANT CHANGE UP (ref 9.9–13.4)
RBC # BLD: 5.09 M/UL — SIGNIFICANT CHANGE UP (ref 4.2–5.8)
RBC # FLD: 12.8 % — SIGNIFICANT CHANGE UP (ref 10.3–14.5)
RSV RNA NPH QL NAA+NON-PROBE: SIGNIFICANT CHANGE UP
SARS-COV-2 RNA SPEC QL NAA+PROBE: SIGNIFICANT CHANGE UP
SODIUM SERPL-SCNC: 136 MMOL/L — SIGNIFICANT CHANGE UP (ref 135–145)
TROPONIN I, HIGH SENSITIVITY RESULT: 18.33 NG/L — SIGNIFICANT CHANGE UP
TROPONIN I, HIGH SENSITIVITY RESULT: 19.75 NG/L — SIGNIFICANT CHANGE UP
WBC # BLD: 11.87 K/UL — HIGH (ref 3.8–10.5)
WBC # FLD AUTO: 11.87 K/UL — HIGH (ref 3.8–10.5)

## 2025-02-13 PROCEDURE — C1894: CPT

## 2025-02-13 PROCEDURE — 85027 COMPLETE CBC AUTOMATED: CPT

## 2025-02-13 PROCEDURE — 93458 L HRT ARTERY/VENTRICLE ANGIO: CPT

## 2025-02-13 PROCEDURE — 80061 LIPID PANEL: CPT

## 2025-02-13 PROCEDURE — 93010 ELECTROCARDIOGRAM REPORT: CPT | Mod: 76

## 2025-02-13 PROCEDURE — 99285 EMERGENCY DEPT VISIT HI MDM: CPT

## 2025-02-13 PROCEDURE — 84484 ASSAY OF TROPONIN QUANT: CPT

## 2025-02-13 PROCEDURE — 84100 ASSAY OF PHOSPHORUS: CPT

## 2025-02-13 PROCEDURE — 76376 3D RENDER W/INTRP POSTPROCES: CPT

## 2025-02-13 PROCEDURE — 71275 CT ANGIOGRAPHY CHEST: CPT | Mod: 26

## 2025-02-13 PROCEDURE — 83735 ASSAY OF MAGNESIUM: CPT

## 2025-02-13 PROCEDURE — 36415 COLL VENOUS BLD VENIPUNCTURE: CPT

## 2025-02-13 PROCEDURE — 80048 BASIC METABOLIC PNL TOTAL CA: CPT

## 2025-02-13 PROCEDURE — 93355 ECHO TRANSESOPHAGEAL (TEE): CPT

## 2025-02-13 PROCEDURE — 74174 CTA ABD&PLVS W/CONTRAST: CPT | Mod: 26

## 2025-02-13 PROCEDURE — C1887: CPT

## 2025-02-13 PROCEDURE — 93306 TTE W/DOPPLER COMPLETE: CPT

## 2025-02-13 PROCEDURE — 71045 X-RAY EXAM CHEST 1 VIEW: CPT | Mod: 26

## 2025-02-13 PROCEDURE — C1769: CPT

## 2025-02-13 RX ORDER — SUCRALFATE 1 G
1 TABLET ORAL ONCE
Refills: 0 | Status: COMPLETED | OUTPATIENT
Start: 2025-02-13 | End: 2025-02-13

## 2025-02-13 RX ORDER — ASPIRIN 325 MG
324 TABLET ORAL ONCE
Refills: 0 | Status: COMPLETED | OUTPATIENT
Start: 2025-02-13 | End: 2025-02-13

## 2025-02-13 RX ORDER — MAGNESIUM, ALUMINUM HYDROXIDE 200-200 MG
30 TABLET,CHEWABLE ORAL ONCE
Refills: 0 | Status: COMPLETED | OUTPATIENT
Start: 2025-02-13 | End: 2025-02-13

## 2025-02-13 RX ORDER — NITROGLYCERIN 20 MG/G
0.4 OINTMENT TOPICAL ONCE
Refills: 0 | Status: COMPLETED | OUTPATIENT
Start: 2025-02-13 | End: 2025-02-13

## 2025-02-13 RX ADMIN — Medication 4 MILLIGRAM(S): at 20:59

## 2025-02-13 RX ADMIN — Medication 40 MILLIGRAM(S): at 23:17

## 2025-02-13 RX ADMIN — Medication 4 MILLIGRAM(S): at 23:17

## 2025-02-13 RX ADMIN — Medication 324 MILLIGRAM(S): at 21:00

## 2025-02-13 RX ADMIN — Medication 1 GRAM(S): at 23:23

## 2025-02-13 RX ADMIN — NITROGLYCERIN 0.4 MILLIGRAM(S): 20 OINTMENT TOPICAL at 21:00

## 2025-02-13 RX ADMIN — Medication 30 MILLILITER(S): at 23:26

## 2025-02-14 ENCOUNTER — RESULT REVIEW (OUTPATIENT)
Age: 75
End: 2025-02-14

## 2025-02-14 DIAGNOSIS — R07.9 CHEST PAIN, UNSPECIFIED: ICD-10-CM

## 2025-02-14 LAB
CHOLEST SERPL-MCNC: 160 MG/DL — SIGNIFICANT CHANGE UP
HDLC SERPL-MCNC: 54 MG/DL — SIGNIFICANT CHANGE UP
LDLC SERPL-MCNC: 93 MG/DL — SIGNIFICANT CHANGE UP
LIPID PNL WITH DIRECT LDL SERPL: 93 MG/DL — SIGNIFICANT CHANGE UP
NONHDLC SERPL-MCNC: 106 MG/DL — SIGNIFICANT CHANGE UP
TRIGL SERPL-MCNC: 68 MG/DL — SIGNIFICANT CHANGE UP
TROPONIN I, HIGH SENSITIVITY RESULT: 12.34 NG/L — SIGNIFICANT CHANGE UP

## 2025-02-14 PROCEDURE — 99222 1ST HOSP IP/OBS MODERATE 55: CPT

## 2025-02-14 PROCEDURE — 99152 MOD SED SAME PHYS/QHP 5/>YRS: CPT

## 2025-02-14 PROCEDURE — 93355 ECHO TRANSESOPHAGEAL (TEE): CPT

## 2025-02-14 PROCEDURE — 93458 L HRT ARTERY/VENTRICLE ANGIO: CPT | Mod: 26

## 2025-02-14 PROCEDURE — 76376 3D RENDER W/INTRP POSTPROCES: CPT | Mod: 26,59

## 2025-02-14 PROCEDURE — 99223 1ST HOSP IP/OBS HIGH 75: CPT | Mod: FS

## 2025-02-14 RX ORDER — MAGNESIUM, ALUMINUM HYDROXIDE 200-200 MG
30 TABLET,CHEWABLE ORAL EVERY 4 HOURS
Refills: 0 | Status: DISCONTINUED | OUTPATIENT
Start: 2025-02-14 | End: 2025-02-15

## 2025-02-14 RX ORDER — QUININE SULFATE 324 MG/1
1 CAPSULE ORAL
Refills: 0 | DISCHARGE

## 2025-02-14 RX ORDER — ENOXAPARIN SODIUM 100 MG/ML
40 INJECTION SUBCUTANEOUS EVERY 24 HOURS
Refills: 0 | Status: DISCONTINUED | OUTPATIENT
Start: 2025-02-15 | End: 2025-02-15

## 2025-02-14 RX ORDER — ACETAMINOPHEN 500 MG/5ML
1000 LIQUID (ML) ORAL ONCE
Refills: 0 | Status: COMPLETED | OUTPATIENT
Start: 2025-02-14 | End: 2025-02-14

## 2025-02-14 RX ORDER — CYST/ALA/Q10/PHOS.SER/DHA/BROC 100-20-50
1 POWDER (GRAM) ORAL
Refills: 0 | DISCHARGE

## 2025-02-14 RX ORDER — NITROGLYCERIN 20 MG/G
0.4 OINTMENT TOPICAL
Refills: 0 | Status: COMPLETED | OUTPATIENT
Start: 2025-02-14 | End: 2025-02-14

## 2025-02-14 RX ORDER — NITROGLYCERIN 20 MG/G
1 OINTMENT TOPICAL DAILY
Refills: 0 | Status: DISCONTINUED | OUTPATIENT
Start: 2025-02-14 | End: 2025-02-14

## 2025-02-14 RX ORDER — SENNA 187 MG
2 TABLET ORAL AT BEDTIME
Refills: 0 | Status: DISCONTINUED | OUTPATIENT
Start: 2025-02-14 | End: 2025-02-15

## 2025-02-14 RX ORDER — HYDROCODONE BITARTRATE AND ACETAMINOPHEN 5; 325 MG/1; MG/1
1 TABLET ORAL
Refills: 0 | DISCHARGE

## 2025-02-14 RX ORDER — B1/B2/B3/B5/B6/B12/VIT C/FOLIC 500-0.5 MG
1 TABLET ORAL DAILY
Refills: 0 | Status: DISCONTINUED | OUTPATIENT
Start: 2025-02-14 | End: 2025-02-15

## 2025-02-14 RX ORDER — OMEGA-3-ACID ETHYL ESTERS CAPSULES 1 G/1
1 CAPSULE, LIQUID FILLED ORAL
Refills: 0 | DISCHARGE

## 2025-02-14 RX ORDER — QUININE SULFATE 324 MG/1
324 CAPSULE ORAL AT BEDTIME
Refills: 0 | Status: DISCONTINUED | OUTPATIENT
Start: 2025-02-14 | End: 2025-02-15

## 2025-02-14 RX ORDER — ASPIRIN 325 MG
81 TABLET ORAL DAILY
Refills: 0 | Status: DISCONTINUED | OUTPATIENT
Start: 2025-02-14 | End: 2025-02-15

## 2025-02-14 RX ORDER — MELATONIN 5 MG
3 TABLET ORAL AT BEDTIME
Refills: 0 | Status: DISCONTINUED | OUTPATIENT
Start: 2025-02-14 | End: 2025-02-15

## 2025-02-14 RX ORDER — ACETAMINOPHEN 500 MG/5ML
650 LIQUID (ML) ORAL EVERY 6 HOURS
Refills: 0 | Status: DISCONTINUED | OUTPATIENT
Start: 2025-02-14 | End: 2025-02-15

## 2025-02-14 RX ORDER — ENOXAPARIN SODIUM 100 MG/ML
40 INJECTION SUBCUTANEOUS EVERY 24 HOURS
Refills: 0 | Status: DISCONTINUED | OUTPATIENT
Start: 2025-02-14 | End: 2025-02-14

## 2025-02-14 RX ORDER — ONDANSETRON HCL/PF 4 MG/2 ML
4 VIAL (ML) INJECTION EVERY 8 HOURS
Refills: 0 | Status: DISCONTINUED | OUTPATIENT
Start: 2025-02-14 | End: 2025-02-15

## 2025-02-14 RX ORDER — PREGABALIN 50 MG/1
0 CAPSULE ORAL
Refills: 0 | DISCHARGE
End: 2025-02-13

## 2025-02-14 RX ADMIN — Medication 2 MILLIGRAM(S): at 10:08

## 2025-02-14 RX ADMIN — Medication 100 MILLILITER(S): at 23:01

## 2025-02-14 RX ADMIN — NITROGLYCERIN 1 PATCH: 20 OINTMENT TOPICAL at 17:34

## 2025-02-14 RX ADMIN — Medication 2 MILLIGRAM(S): at 09:08

## 2025-02-14 RX ADMIN — Medication 400 MILLIGRAM(S): at 18:52

## 2025-02-14 RX ADMIN — Medication 4 MILLIGRAM(S): at 03:48

## 2025-02-14 RX ADMIN — Medication 250 MILLILITER(S): at 16:38

## 2025-02-14 RX ADMIN — NITROGLYCERIN 0.4 MILLIGRAM(S): 20 OINTMENT TOPICAL at 09:03

## 2025-02-14 RX ADMIN — Medication 650 MILLIGRAM(S): at 14:23

## 2025-02-14 RX ADMIN — Medication 3 MILLIGRAM(S): at 23:02

## 2025-02-14 RX ADMIN — ENOXAPARIN SODIUM 40 MILLIGRAM(S): 100 INJECTION SUBCUTANEOUS at 23:16

## 2025-02-14 RX ADMIN — Medication 650 MILLIGRAM(S): at 13:55

## 2025-02-14 RX ADMIN — QUININE SULFATE 324 MILLIGRAM(S): 324 CAPSULE ORAL at 23:01

## 2025-02-14 RX ADMIN — Medication 1 TABLET(S): at 09:16

## 2025-02-14 RX ADMIN — Medication 2 TABLET(S): at 23:01

## 2025-02-14 RX ADMIN — NITROGLYCERIN 1 PATCH: 20 OINTMENT TOPICAL at 11:43

## 2025-02-14 RX ADMIN — Medication 81 MILLIGRAM(S): at 09:16

## 2025-02-14 RX ADMIN — NITROGLYCERIN 0.4 MILLIGRAM(S): 20 OINTMENT TOPICAL at 08:43

## 2025-02-14 RX ADMIN — NITROGLYCERIN 0.4 MILLIGRAM(S): 20 OINTMENT TOPICAL at 08:50

## 2025-02-15 ENCOUNTER — TRANSCRIPTION ENCOUNTER (OUTPATIENT)
Age: 75
End: 2025-02-15

## 2025-02-15 VITALS
OXYGEN SATURATION: 94 % | HEART RATE: 74 BPM | SYSTOLIC BLOOD PRESSURE: 149 MMHG | RESPIRATION RATE: 18 BRPM | DIASTOLIC BLOOD PRESSURE: 86 MMHG | TEMPERATURE: 98 F

## 2025-02-15 LAB
ANION GAP SERPL CALC-SCNC: 6 MMOL/L — SIGNIFICANT CHANGE UP (ref 5–17)
BUN SERPL-MCNC: 15 MG/DL — SIGNIFICANT CHANGE UP (ref 7–23)
CALCIUM SERPL-MCNC: 8.6 MG/DL — SIGNIFICANT CHANGE UP (ref 8.5–10.1)
CHLORIDE SERPL-SCNC: 108 MMOL/L — SIGNIFICANT CHANGE UP (ref 96–108)
CO2 SERPL-SCNC: 24 MMOL/L — SIGNIFICANT CHANGE UP (ref 22–31)
CREAT SERPL-MCNC: 1.06 MG/DL — SIGNIFICANT CHANGE UP (ref 0.5–1.3)
EGFR: 74 ML/MIN/1.73M2 — SIGNIFICANT CHANGE UP
EGFR: 74 ML/MIN/1.73M2 — SIGNIFICANT CHANGE UP
GLUCOSE SERPL-MCNC: 113 MG/DL — HIGH (ref 70–99)
HCT VFR BLD CALC: 42.4 % — SIGNIFICANT CHANGE UP (ref 39–50)
HGB BLD-MCNC: 14.2 G/DL — SIGNIFICANT CHANGE UP (ref 13–17)
MAGNESIUM SERPL-MCNC: 1.9 MG/DL — SIGNIFICANT CHANGE UP (ref 1.6–2.6)
MCHC RBC-ENTMCNC: 30.7 PG — SIGNIFICANT CHANGE UP (ref 27–34)
MCHC RBC-ENTMCNC: 33.5 G/DL — SIGNIFICANT CHANGE UP (ref 32–36)
MCV RBC AUTO: 91.8 FL — SIGNIFICANT CHANGE UP (ref 80–100)
NRBC # BLD AUTO: 0 K/UL — SIGNIFICANT CHANGE UP (ref 0–0)
NRBC # FLD: 0 K/UL — SIGNIFICANT CHANGE UP (ref 0–0)
NRBC BLD AUTO-RTO: 0 /100 WBCS — SIGNIFICANT CHANGE UP (ref 0–0)
PHOSPHATE SERPL-MCNC: 3.1 MG/DL — SIGNIFICANT CHANGE UP (ref 2.5–4.5)
PLATELET # BLD AUTO: 224 K/UL — SIGNIFICANT CHANGE UP (ref 150–400)
PMV BLD: 9.3 FL — SIGNIFICANT CHANGE UP (ref 7–13)
POTASSIUM SERPL-MCNC: 3.9 MMOL/L — SIGNIFICANT CHANGE UP (ref 3.5–5.3)
POTASSIUM SERPL-SCNC: 3.9 MMOL/L — SIGNIFICANT CHANGE UP (ref 3.5–5.3)
RBC # BLD: 4.62 M/UL — SIGNIFICANT CHANGE UP (ref 4.2–5.8)
RBC # FLD: 12.7 % — SIGNIFICANT CHANGE UP (ref 10.3–14.5)
SODIUM SERPL-SCNC: 138 MMOL/L — SIGNIFICANT CHANGE UP (ref 135–145)
WBC # BLD: 6.86 K/UL — SIGNIFICANT CHANGE UP (ref 3.8–10.5)
WBC # FLD AUTO: 6.86 K/UL — SIGNIFICANT CHANGE UP (ref 3.8–10.5)

## 2025-02-15 PROCEDURE — 99239 HOSP IP/OBS DSCHRG MGMT >30: CPT

## 2025-02-15 PROCEDURE — 99233 SBSQ HOSP IP/OBS HIGH 50: CPT | Mod: FS

## 2025-02-15 RX ORDER — ASPIRIN 325 MG
1 TABLET ORAL
Qty: 30 | Refills: 1
Start: 2025-02-15 | End: 2025-04-15

## 2025-02-15 RX ORDER — LOSARTAN POTASSIUM 100 MG/1
1 TABLET, FILM COATED ORAL
Qty: 30 | Refills: 0
Start: 2025-02-15 | End: 2025-03-16

## 2025-02-15 RX ORDER — ATORVASTATIN CALCIUM 80 MG/1
1 TABLET, FILM COATED ORAL
Qty: 30 | Refills: 1
Start: 2025-02-15 | End: 2025-04-15

## 2025-02-15 RX ORDER — QUININE SULFATE 324 MG/1
0 CAPSULE ORAL
Refills: 0 | DISCHARGE

## 2025-02-15 RX ADMIN — Medication 650 MILLIGRAM(S): at 13:00

## 2025-02-15 RX ADMIN — Medication 650 MILLIGRAM(S): at 02:23

## 2025-02-15 RX ADMIN — Medication 81 MILLIGRAM(S): at 10:13

## 2025-02-15 RX ADMIN — Medication 650 MILLIGRAM(S): at 03:00

## 2025-02-15 RX ADMIN — Medication 1 TABLET(S): at 10:13

## 2025-02-17 ENCOUNTER — NON-APPOINTMENT (OUTPATIENT)
Age: 75
End: 2025-02-17

## 2025-02-20 DIAGNOSIS — M51.16 INTERVERTEBRAL DISC DISORDERS WITH RADICULOPATHY, LUMBAR REGION: ICD-10-CM

## 2025-02-20 DIAGNOSIS — I10 ESSENTIAL (PRIMARY) HYPERTENSION: ICD-10-CM

## 2025-02-20 DIAGNOSIS — Z85.46 PERSONAL HISTORY OF MALIGNANT NEOPLASM OF PROSTATE: ICD-10-CM

## 2025-02-20 DIAGNOSIS — I25.10 ATHEROSCLEROTIC HEART DISEASE OF NATIVE CORONARY ARTERY WITHOUT ANGINA PECTORIS: ICD-10-CM

## 2025-02-21 DIAGNOSIS — M62.838 OTHER MUSCLE SPASM: ICD-10-CM

## 2025-02-21 DIAGNOSIS — Z91.018 ALLERGY TO OTHER FOODS: ICD-10-CM

## 2025-02-21 DIAGNOSIS — Z90.79 ACQUIRED ABSENCE OF OTHER GENITAL ORGAN(S): ICD-10-CM

## 2025-02-21 DIAGNOSIS — R07.9 CHEST PAIN, UNSPECIFIED: ICD-10-CM

## 2025-02-21 DIAGNOSIS — G89.29 OTHER CHRONIC PAIN: ICD-10-CM

## 2025-02-21 DIAGNOSIS — Z79.52 LONG TERM (CURRENT) USE OF SYSTEMIC STEROIDS: ICD-10-CM

## 2025-02-21 DIAGNOSIS — Z82.49 FAMILY HISTORY OF ISCHEMIC HEART DISEASE AND OTHER DISEASES OF THE CIRCULATORY SYSTEM: ICD-10-CM

## 2025-03-06 ENCOUNTER — OFFICE (OUTPATIENT)
Dept: URBAN - METROPOLITAN AREA CLINIC 102 | Facility: CLINIC | Age: 75
Setting detail: OPHTHALMOLOGY
End: 2025-03-06
Payer: MEDICARE

## 2025-03-06 DIAGNOSIS — H35.3111: ICD-10-CM

## 2025-03-06 DIAGNOSIS — H40.033: ICD-10-CM

## 2025-03-06 DIAGNOSIS — H25.13: ICD-10-CM

## 2025-03-06 DIAGNOSIS — H43.392: ICD-10-CM

## 2025-03-06 DIAGNOSIS — H43.811: ICD-10-CM

## 2025-03-06 DIAGNOSIS — H35.3122: ICD-10-CM

## 2025-03-06 PROCEDURE — 92014 COMPRE OPH EXAM EST PT 1/>: CPT | Performed by: OPHTHALMOLOGY

## 2025-03-06 PROCEDURE — 92134 CPTRZ OPH DX IMG PST SGM RTA: CPT | Performed by: OPHTHALMOLOGY

## 2025-03-06 PROCEDURE — 92020 GONIOSCOPY: CPT | Performed by: OPHTHALMOLOGY

## 2025-03-06 ASSESSMENT — KERATOMETRY
OD_K2POWER_DIOPTERS: 43.50
OD_AXISANGLE_DEGREES: 096
OD_K1POWER_DIOPTERS: 42.75
METHOD_AUTO_MANUAL: AUTO
OS_AXISANGLE_DEGREES: 089
OS_K1POWER_DIOPTERS: 43.25
OS_K2POWER_DIOPTERS: 43.50

## 2025-03-06 ASSESSMENT — REFRACTION_AUTOREFRACTION
OD_AXIS: 071
OD_SPHERE: +0.75
OD_CYLINDER: -0.25
OS_CYLINDER: -1.00
OS_AXIS: 066
OS_SPHERE: +1.50

## 2025-03-06 ASSESSMENT — CONFRONTATIONAL VISUAL FIELD TEST (CVF)
OS_FINDINGS: FULL
OD_FINDINGS: FULL

## 2025-03-06 ASSESSMENT — TONOMETRY
OS_IOP_MMHG: 17
OD_IOP_MMHG: 16

## 2025-03-06 ASSESSMENT — LID POSITION - DERMATOCHALASIS
OD_DERMATOCHALASIS: RLL RUL 1+ 2+
OS_DERMATOCHALASIS: LLL LUL 1+ 2+

## 2025-03-06 ASSESSMENT — VISUAL ACUITY
OD_BCVA: 20/25+2
OS_BCVA: 20/25-2

## 2025-03-17 ENCOUNTER — APPOINTMENT (OUTPATIENT)
Dept: INTERNAL MEDICINE | Facility: CLINIC | Age: 75
End: 2025-03-17
Payer: MEDICARE

## 2025-03-17 VITALS
DIASTOLIC BLOOD PRESSURE: 68 MMHG | BODY MASS INDEX: 34.58 KG/M2 | HEART RATE: 60 BPM | TEMPERATURE: 97.5 F | SYSTOLIC BLOOD PRESSURE: 130 MMHG | OXYGEN SATURATION: 97 % | WEIGHT: 233.44 LBS | HEIGHT: 69 IN

## 2025-03-17 DIAGNOSIS — G47.33 OBSTRUCTIVE SLEEP APNEA (ADULT) (PEDIATRIC): ICD-10-CM

## 2025-03-17 DIAGNOSIS — E66.9 OBESITY, UNSPECIFIED: ICD-10-CM

## 2025-03-17 PROCEDURE — 99214 OFFICE O/P EST MOD 30 MIN: CPT

## 2025-03-17 PROCEDURE — G2211 COMPLEX E/M VISIT ADD ON: CPT

## 2025-03-17 RX ORDER — ATORVASTATIN CALCIUM 40 MG/1
40 TABLET, FILM COATED ORAL
Refills: 0 | Status: ACTIVE | COMMUNITY

## 2025-04-02 ENCOUNTER — NON-APPOINTMENT (OUTPATIENT)
Age: 75
End: 2025-04-02

## 2025-04-08 ENCOUNTER — NON-APPOINTMENT (OUTPATIENT)
Age: 75
End: 2025-04-08

## 2025-04-17 NOTE — PATIENT PROFILE ADULT. - LIVES WITH, PROFILE
Left message on the recorder, had a CT scan done two days ago, have we recieved any results yet?    spouse

## 2025-05-01 ENCOUNTER — RX RENEWAL (OUTPATIENT)
Age: 75
End: 2025-05-01

## 2025-05-19 ENCOUNTER — APPOINTMENT (OUTPATIENT)
Dept: INTERNAL MEDICINE | Facility: CLINIC | Age: 75
End: 2025-05-19
Payer: MEDICARE

## 2025-05-19 ENCOUNTER — NON-APPOINTMENT (OUTPATIENT)
Age: 75
End: 2025-05-19

## 2025-05-19 VITALS
BODY MASS INDEX: 33.18 KG/M2 | OXYGEN SATURATION: 97 % | RESPIRATION RATE: 16 BRPM | WEIGHT: 224 LBS | HEART RATE: 64 BPM | TEMPERATURE: 98.5 F | SYSTOLIC BLOOD PRESSURE: 129 MMHG | DIASTOLIC BLOOD PRESSURE: 66 MMHG | HEIGHT: 69 IN

## 2025-05-19 DIAGNOSIS — M54.50 LOW BACK PAIN, UNSPECIFIED: ICD-10-CM

## 2025-05-19 DIAGNOSIS — J45.20 MILD INTERMITTENT ASTHMA, UNCOMPLICATED: ICD-10-CM

## 2025-05-19 DIAGNOSIS — I87.2 VENOUS INSUFFICIENCY (CHRONIC) (PERIPHERAL): ICD-10-CM

## 2025-05-19 DIAGNOSIS — J98.4 OTHER DISORDERS OF LUNG: ICD-10-CM

## 2025-05-19 DIAGNOSIS — E66.9 OBESITY, UNSPECIFIED: ICD-10-CM

## 2025-05-19 DIAGNOSIS — G89.29 LOW BACK PAIN, UNSPECIFIED: ICD-10-CM

## 2025-05-19 DIAGNOSIS — R73.03 PREDIABETES.: ICD-10-CM

## 2025-05-19 DIAGNOSIS — G47.33 OBSTRUCTIVE SLEEP APNEA (ADULT) (PEDIATRIC): ICD-10-CM

## 2025-05-19 PROCEDURE — 94727 GAS DIL/WSHOT DETER LNG VOL: CPT

## 2025-05-19 PROCEDURE — 93000 ELECTROCARDIOGRAM COMPLETE: CPT

## 2025-05-19 PROCEDURE — 99214 OFFICE O/P EST MOD 30 MIN: CPT | Mod: 25

## 2025-05-19 PROCEDURE — ZZZZZ: CPT

## 2025-05-19 PROCEDURE — 94729 DIFFUSING CAPACITY: CPT

## 2025-05-19 PROCEDURE — 94060 EVALUATION OF WHEEZING: CPT

## 2025-05-19 RX ORDER — CHOLECALCIFEROL (VITAMIN D3) 25 MCG
TABLET ORAL
Refills: 0 | Status: ACTIVE | COMMUNITY

## 2025-05-19 RX ORDER — VIT A/VIT C/VIT E/ZINC/COPPER 2148-113
TABLET ORAL
Refills: 0 | Status: ACTIVE | COMMUNITY

## 2025-05-23 RX ORDER — BENZONATATE 100 MG/1
100 CAPSULE ORAL 3 TIMES DAILY
Qty: 270 | Refills: 1 | Status: ACTIVE | COMMUNITY
Start: 2025-05-23 | End: 1900-01-01

## 2025-06-24 ENCOUNTER — APPOINTMENT (OUTPATIENT)
Dept: VASCULAR SURGERY | Facility: CLINIC | Age: 75
End: 2025-06-24

## 2025-06-24 ENCOUNTER — APPOINTMENT (OUTPATIENT)
Dept: VASCULAR SURGERY | Facility: CLINIC | Age: 75
End: 2025-06-24
Payer: MEDICARE

## 2025-06-24 VITALS
HEART RATE: 63 BPM | DIASTOLIC BLOOD PRESSURE: 72 MMHG | HEIGHT: 69 IN | OXYGEN SATURATION: 98 % | BODY MASS INDEX: 33.33 KG/M2 | WEIGHT: 225 LBS | SYSTOLIC BLOOD PRESSURE: 144 MMHG

## 2025-06-24 PROBLEM — I83.10 VARICOSE VEINS WITH INFLAMMATION: Status: ACTIVE | Noted: 2025-06-24

## 2025-06-24 PROBLEM — I87.8 VENOUS STASIS: Status: ACTIVE | Noted: 2025-06-24

## 2025-06-24 PROCEDURE — 93971 EXTREMITY STUDY: CPT | Mod: LT

## 2025-06-24 PROCEDURE — 99204 OFFICE O/P NEW MOD 45 MIN: CPT
